# Patient Record
Sex: MALE | Race: WHITE | Employment: FULL TIME | URBAN - NONMETROPOLITAN AREA
[De-identification: names, ages, dates, MRNs, and addresses within clinical notes are randomized per-mention and may not be internally consistent; named-entity substitution may affect disease eponyms.]

---

## 2017-06-01 ENCOUNTER — OFFICE VISIT (OUTPATIENT)
Dept: PRIMARY CARE CLINIC | Age: 32
End: 2017-06-01
Payer: COMMERCIAL

## 2017-06-01 ENCOUNTER — HOSPITAL ENCOUNTER (OUTPATIENT)
Dept: GENERAL RADIOLOGY | Age: 32
Discharge: HOME | End: 2017-06-01
Payer: COMMERCIAL

## 2017-06-01 VITALS
HEIGHT: 68 IN | HEART RATE: 81 BPM | WEIGHT: 243.4 LBS | SYSTOLIC BLOOD PRESSURE: 132 MMHG | DIASTOLIC BLOOD PRESSURE: 78 MMHG | OXYGEN SATURATION: 98 % | BODY MASS INDEX: 36.89 KG/M2 | TEMPERATURE: 98 F

## 2017-06-01 DIAGNOSIS — R07.9 CHEST PAIN, UNSPECIFIED TYPE: ICD-10-CM

## 2017-06-01 DIAGNOSIS — R07.9 CHEST PAIN, UNSPECIFIED: Primary | ICD-10-CM

## 2017-06-01 DIAGNOSIS — R07.9 CHEST PAIN, UNSPECIFIED TYPE: Primary | ICD-10-CM

## 2017-06-01 PROCEDURE — 93000 ELECTROCARDIOGRAM COMPLETE: CPT | Performed by: FAMILY MEDICINE

## 2017-06-01 PROCEDURE — 99203 OFFICE O/P NEW LOW 30 MIN: CPT | Performed by: FAMILY MEDICINE

## 2017-06-01 PROCEDURE — 71020 XR CHEST STANDARD TWO VW: CPT

## 2017-06-01 RX ORDER — CETIRIZINE HYDROCHLORIDE 10 MG/1
10 TABLET ORAL PRN
COMMUNITY

## 2017-06-01 ASSESSMENT — ENCOUNTER SYMPTOMS
NAUSEA: 0
SHORTNESS OF BREATH: 1
VOMITING: 0

## 2017-06-05 ENCOUNTER — HOSPITAL ENCOUNTER (OUTPATIENT)
Dept: NON INVASIVE DIAGNOSTICS | Age: 32
Discharge: HOME | End: 2017-06-05
Payer: COMMERCIAL

## 2017-06-05 DIAGNOSIS — R07.9 CHEST PAIN, UNSPECIFIED TYPE: ICD-10-CM

## 2017-06-05 LAB
ALBUMIN SERPL-MCNC: 4.3 G/DL (ref 3.5–5.2)
ALP BLD-CCNC: 80 U/L (ref 40–130)
ALT SERPL-CCNC: 28 U/L (ref 5–41)
ANION GAP SERPL CALCULATED.3IONS-SCNC: 14 MMOL/L (ref 7–19)
AST SERPL-CCNC: 17 U/L (ref 5–40)
BILIRUB SERPL-MCNC: 0.6 MG/DL (ref 0.2–1.2)
BUN BLDV-MCNC: 16 MG/DL (ref 6–20)
CALCIUM SERPL-MCNC: 9.5 MG/DL (ref 8.6–10)
CHLORIDE BLD-SCNC: 102 MMOL/L (ref 98–111)
CHOLESTEROL, TOTAL: 200 MG/DL (ref 160–199)
CO2: 24 MMOL/L (ref 22–29)
CREAT SERPL-MCNC: 0.9 MG/DL (ref 0.5–1.2)
GFR NON-AFRICAN AMERICAN: >60
GLUCOSE BLD-MCNC: 90 MG/DL (ref 74–109)
HCT VFR BLD CALC: 40.2 % (ref 42–52)
HDLC SERPL-MCNC: 46 MG/DL (ref 55–121)
HEMOGLOBIN: 13.6 G/DL (ref 14–18)
LDL CHOLESTEROL CALCULATED: 114 MG/DL
LV EF: 50 %
LVEF MODALITY: NORMAL
MCH RBC QN AUTO: 28.4 PG (ref 27–31)
MCHC RBC AUTO-ENTMCNC: 33.8 G/DL (ref 33–37)
MCV RBC AUTO: 83.9 FL (ref 80–94)
PDW BLD-RTO: 12.4 % (ref 11.5–14.5)
PLATELET # BLD: 243 K/UL (ref 130–400)
PMV BLD AUTO: 9.9 FL (ref 9.4–12.4)
POTASSIUM SERPL-SCNC: 4.1 MMOL/L (ref 3.5–5)
RBC # BLD: 4.79 M/UL (ref 4.7–6.1)
SODIUM BLD-SCNC: 140 MMOL/L (ref 136–145)
TOTAL PROTEIN: 7.1 G/DL (ref 6.6–8.7)
TRIGL SERPL-MCNC: 202 MG/DL (ref 150–199)
WBC # BLD: 5.5 K/UL (ref 4.8–10.8)

## 2017-06-05 PROCEDURE — 93350 STRESS TTE ONLY: CPT

## 2017-06-06 ENCOUNTER — TELEPHONE (OUTPATIENT)
Dept: PRIMARY CARE CLINIC | Age: 32
End: 2017-06-06

## 2017-10-10 RX ORDER — AZITHROMYCIN 250 MG/1
TABLET, FILM COATED ORAL
Qty: 1 PACKET | Refills: 0 | Status: SHIPPED | OUTPATIENT
Start: 2017-10-10 | End: 2017-10-20

## 2018-12-20 RX ORDER — AMOXICILLIN AND CLAVULANATE POTASSIUM 875; 125 MG/1; MG/1
1 TABLET, FILM COATED ORAL 2 TIMES DAILY
Qty: 20 TABLET | Refills: 0 | Status: SHIPPED | OUTPATIENT
Start: 2018-12-20 | End: 2019-02-20 | Stop reason: SDUPTHER

## 2019-02-20 ENCOUNTER — OFFICE VISIT (OUTPATIENT)
Dept: PRIMARY CARE CLINIC | Age: 34
End: 2019-02-20
Payer: COMMERCIAL

## 2019-02-20 VITALS
HEIGHT: 68 IN | SYSTOLIC BLOOD PRESSURE: 124 MMHG | BODY MASS INDEX: 34.95 KG/M2 | WEIGHT: 230.6 LBS | DIASTOLIC BLOOD PRESSURE: 82 MMHG | TEMPERATURE: 98 F | OXYGEN SATURATION: 96 % | HEART RATE: 98 BPM

## 2019-02-20 DIAGNOSIS — J01.00 ACUTE NON-RECURRENT MAXILLARY SINUSITIS: Primary | ICD-10-CM

## 2019-02-20 PROCEDURE — 1036F TOBACCO NON-USER: CPT | Performed by: FAMILY MEDICINE

## 2019-02-20 PROCEDURE — G8427 DOCREV CUR MEDS BY ELIG CLIN: HCPCS | Performed by: FAMILY MEDICINE

## 2019-02-20 PROCEDURE — 99213 OFFICE O/P EST LOW 20 MIN: CPT | Performed by: FAMILY MEDICINE

## 2019-02-20 PROCEDURE — G8417 CALC BMI ABV UP PARAM F/U: HCPCS | Performed by: FAMILY MEDICINE

## 2019-02-20 PROCEDURE — G8484 FLU IMMUNIZE NO ADMIN: HCPCS | Performed by: FAMILY MEDICINE

## 2019-02-20 RX ORDER — AMOXICILLIN AND CLAVULANATE POTASSIUM 875; 125 MG/1; MG/1
1 TABLET, FILM COATED ORAL 2 TIMES DAILY
Qty: 20 TABLET | Refills: 0 | Status: SHIPPED | OUTPATIENT
Start: 2019-02-20 | End: 2019-03-02

## 2019-02-20 ASSESSMENT — PATIENT HEALTH QUESTIONNAIRE - PHQ9
SUM OF ALL RESPONSES TO PHQ QUESTIONS 1-9: 0
SUM OF ALL RESPONSES TO PHQ9 QUESTIONS 1 & 2: 0
SUM OF ALL RESPONSES TO PHQ QUESTIONS 1-9: 0
1. LITTLE INTEREST OR PLEASURE IN DOING THINGS: 0
2. FEELING DOWN, DEPRESSED OR HOPELESS: 0

## 2019-02-20 ASSESSMENT — ENCOUNTER SYMPTOMS
COUGH: 1
SHORTNESS OF BREATH: 0
RHINORRHEA: 1

## 2019-08-22 ENCOUNTER — OFFICE VISIT (OUTPATIENT)
Dept: PRIMARY CARE CLINIC | Age: 34
End: 2019-08-22
Payer: COMMERCIAL

## 2019-08-22 VITALS
SYSTOLIC BLOOD PRESSURE: 110 MMHG | HEIGHT: 68 IN | RESPIRATION RATE: 20 BRPM | HEART RATE: 88 BPM | WEIGHT: 267 LBS | BODY MASS INDEX: 40.47 KG/M2 | DIASTOLIC BLOOD PRESSURE: 70 MMHG | OXYGEN SATURATION: 98 %

## 2019-08-22 DIAGNOSIS — E66.01 MORBID OBESITY WITH BMI OF 40.0-44.9, ADULT (HCC): ICD-10-CM

## 2019-08-22 DIAGNOSIS — Z13.0 SCREENING FOR DEFICIENCY ANEMIA: ICD-10-CM

## 2019-08-22 DIAGNOSIS — Z00.00 ROUTINE GENERAL MEDICAL EXAMINATION AT A HEALTH CARE FACILITY: Primary | ICD-10-CM

## 2019-08-22 DIAGNOSIS — Z13.89 SCREENING FOR BLOOD OR PROTEIN IN URINE: ICD-10-CM

## 2019-08-22 DIAGNOSIS — F90.2 ATTENTION DEFICIT HYPERACTIVITY DISORDER (ADHD), COMBINED TYPE: ICD-10-CM

## 2019-08-22 DIAGNOSIS — Z13.220 SCREENING, LIPID: ICD-10-CM

## 2019-08-22 PROCEDURE — 99213 OFFICE O/P EST LOW 20 MIN: CPT | Performed by: FAMILY MEDICINE

## 2019-08-22 PROCEDURE — 99395 PREV VISIT EST AGE 18-39: CPT | Performed by: FAMILY MEDICINE

## 2019-08-22 RX ORDER — DEXTROAMPHETAMINE SACCHARATE, AMPHETAMINE ASPARTATE MONOHYDRATE, DEXTROAMPHETAMINE SULFATE AND AMPHETAMINE SULFATE 3.75; 3.75; 3.75; 3.75 MG/1; MG/1; MG/1; MG/1
15 CAPSULE, EXTENDED RELEASE ORAL DAILY
Qty: 30 CAPSULE | Refills: 0 | Status: SHIPPED | OUTPATIENT
Start: 2019-08-22 | End: 2019-11-14 | Stop reason: SDUPTHER

## 2019-08-22 ASSESSMENT — ENCOUNTER SYMPTOMS
ABDOMINAL PAIN: 0
COUGH: 0
COLOR CHANGE: 0
RHINORRHEA: 0
NAUSEA: 0
SORE THROAT: 0
EYE ITCHING: 0
SHORTNESS OF BREATH: 0

## 2019-08-22 NOTE — PROGRESS NOTES
Mary Anne Rice is a 35 y.o. male who presents today for   Chief Complaint   Patient presents with    Annual Exam       HPI  Patient is here for establishing today and due for annual exam.  No h/o cardipulmonary disease but had walking PNA at ge 13. Otherwise he has been previously healthy. He denies early cardiac death or disease in his family. Denies early colon or prostate cancer. He does have a history of weight gain. He notes he used to work out and lift weights. 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 No change in PMH, family, social, or surgical history unless mentioned above. Review of Systems   Constitutional: Negative for chills and fever. HENT: Negative for rhinorrhea and sore throat. Eyes: Negative for itching and visual disturbance. Respiratory: Negative for cough and shortness of breath. Cardiovascular: Negative for chest pain and palpitations. Gastrointestinal: Negative for abdominal pain and nausea. Endocrine: Negative for polydipsia and polyuria. Genitourinary: Negative for dysuria and frequency. Musculoskeletal: Negative for arthralgias and myalgias. Skin: Negative for color change and rash. Allergic/Immunologic: Negative for environmental allergies and food allergies. Neurological: Negative for dizziness and light-headedness. Hematological: Negative for adenopathy. Does not bruise/bleed easily. Psychiatric/Behavioral: Negative for dysphoric mood. The patient is not nervous/anxious. No past medical history on file. Current Outpatient Medications   Medication Sig Dispense Refill    amphetamine-dextroamphetamine (ADDERALL XR) 15 MG extended release capsule Take 1 capsule by mouth daily for 30 days. 30 capsule 0    cetirizine (ZYRTEC) 10 MG tablet Take 10 mg by mouth as needed for Allergies       No current facility-administered medications for this visit.         No Known Allergies    No past surgical history on

## 2019-09-10 ENCOUNTER — OFFICE VISIT (OUTPATIENT)
Dept: URGENT CARE | Age: 34
End: 2019-09-10
Payer: COMMERCIAL

## 2019-09-10 VITALS
RESPIRATION RATE: 18 BRPM | OXYGEN SATURATION: 98 % | WEIGHT: 242 LBS | HEART RATE: 87 BPM | BODY MASS INDEX: 36.68 KG/M2 | TEMPERATURE: 97.7 F | HEIGHT: 68 IN | SYSTOLIC BLOOD PRESSURE: 130 MMHG | DIASTOLIC BLOOD PRESSURE: 80 MMHG

## 2019-09-10 DIAGNOSIS — J06.9 URI WITH COUGH AND CONGESTION: Primary | ICD-10-CM

## 2019-09-10 PROCEDURE — 1036F TOBACCO NON-USER: CPT | Performed by: NURSE PRACTITIONER

## 2019-09-10 PROCEDURE — 99213 OFFICE O/P EST LOW 20 MIN: CPT | Performed by: NURSE PRACTITIONER

## 2019-09-10 PROCEDURE — G8427 DOCREV CUR MEDS BY ELIG CLIN: HCPCS | Performed by: NURSE PRACTITIONER

## 2019-09-10 PROCEDURE — G8417 CALC BMI ABV UP PARAM F/U: HCPCS | Performed by: NURSE PRACTITIONER

## 2019-09-10 RX ORDER — BENZONATATE 100 MG/1
100 CAPSULE ORAL 3 TIMES DAILY PRN
Qty: 21 CAPSULE | Refills: 0 | Status: SHIPPED | OUTPATIENT
Start: 2019-09-10 | End: 2019-09-17

## 2019-09-10 RX ORDER — METHYLPREDNISOLONE 4 MG/1
TABLET ORAL
Qty: 1 KIT | Refills: 0 | Status: SHIPPED | OUTPATIENT
Start: 2019-09-10 | End: 2019-11-14 | Stop reason: ALTCHOICE

## 2019-09-10 RX ORDER — DEXTROMETHORPHAN HYDROBROMIDE AND PROMETHAZINE HYDROCHLORIDE 15; 6.25 MG/5ML; MG/5ML
5 SYRUP ORAL NIGHTLY PRN
Qty: 120 ML | Refills: 0 | Status: SHIPPED | OUTPATIENT
Start: 2019-09-10 | End: 2020-02-20

## 2019-09-10 RX ORDER — AZITHROMYCIN 250 MG/1
TABLET, FILM COATED ORAL
Qty: 1 PACKET | Refills: 0 | Status: SHIPPED | OUTPATIENT
Start: 2019-09-10 | End: 2019-09-20

## 2019-09-10 ASSESSMENT — ENCOUNTER SYMPTOMS
SORE THROAT: 0
COUGH: 1
RHINORRHEA: 1

## 2019-09-10 NOTE — PROGRESS NOTES
6.25-15 MG/5ML syrup    benzonatate (TESSALON) 100 MG capsule       :Plan   1. Rest and increase fluid intake. 2. Take tessalon as needed for coughing. Take Promethazine DM as needed at night for cough-this medication can make you drowsy. 3. Monitor for fever and treat as needed with tylenol or ibuprofen  4. If patient is not improving or developing any new/worsening symptoms then return to clinic as needed or follow up with PCP  5. Antibiotic and steroid pack as prescribed      No orders of the defined types were placed in this encounter. Return if symptoms worsen or fail to improve. Orders Placed This Encounter   Medications    azithromycin (ZITHROMAX Z-EUSEBIO) 250 MG tablet     Sig: Take as directed     Dispense:  1 packet     Refill:  0    methylPREDNISolone (MEDROL DOSEPACK) 4 MG tablet     Sig: Take by mouth. Dispense:  1 kit     Refill:  0    promethazine-dextromethorphan (PROMETHAZINE-DM) 6.25-15 MG/5ML syrup     Sig: Take 5 mLs by mouth nightly as needed for Cough     Dispense:  120 mL     Refill:  0    benzonatate (TESSALON) 100 MG capsule     Sig: Take 1 capsule by mouth 3 times daily as needed for Cough     Dispense:  21 capsule     Refill:  0       Patient given educational materials- see patient instructions. Discussed use, benefit, and side effects of prescribedmedications. All patient questions answered. Pt voiced understanding. Patient Instructions       Patient Education        Upper Respiratory Infection (Cold): Care Instructions  Your Care Instructions    An upper respiratory infection, or URI, is an infection of the nose, sinuses, or throat. URIs are spread by coughs, sneezes, and direct contact. The common cold is the most frequent kind of URI. The flu and sinus infections are other kinds of URIs. Almost all URIs are caused by viruses. Antibiotics won't cure them. But you can treat most infections with home care.  This may include drinking lots of fluids and taking

## 2019-09-30 ENCOUNTER — OFFICE VISIT (OUTPATIENT)
Dept: PRIMARY CARE CLINIC | Age: 34
End: 2019-09-30
Payer: COMMERCIAL

## 2019-09-30 DIAGNOSIS — F90.2 ATTENTION DEFICIT HYPERACTIVITY DISORDER (ADHD), COMBINED TYPE: Primary | ICD-10-CM

## 2019-09-30 DIAGNOSIS — R09.82 POSTNASAL DRIP: ICD-10-CM

## 2019-09-30 DIAGNOSIS — R05.3 CHRONIC COUGH: ICD-10-CM

## 2019-09-30 PROCEDURE — 99213 OFFICE O/P EST LOW 20 MIN: CPT | Performed by: FAMILY MEDICINE

## 2019-09-30 PROCEDURE — G8417 CALC BMI ABV UP PARAM F/U: HCPCS | Performed by: FAMILY MEDICINE

## 2019-09-30 PROCEDURE — 1036F TOBACCO NON-USER: CPT | Performed by: FAMILY MEDICINE

## 2019-09-30 PROCEDURE — G8428 CUR MEDS NOT DOCUMENT: HCPCS | Performed by: FAMILY MEDICINE

## 2019-09-30 RX ORDER — DEXTROAMPHETAMINE SACCHARATE, AMPHETAMINE ASPARTATE MONOHYDRATE, DEXTROAMPHETAMINE SULFATE AND AMPHETAMINE SULFATE 7.5; 7.5; 7.5; 7.5 MG/1; MG/1; MG/1; MG/1
30 CAPSULE, EXTENDED RELEASE ORAL DAILY
Qty: 30 CAPSULE | Refills: 0 | Status: SHIPPED | OUTPATIENT
Start: 2019-09-30 | End: 2019-11-14 | Stop reason: ALTCHOICE

## 2019-09-30 RX ORDER — FLUTICASONE PROPIONATE 50 MCG
1 SPRAY, SUSPENSION (ML) NASAL DAILY
Qty: 2 BOTTLE | Refills: 1 | Status: SHIPPED | OUTPATIENT
Start: 2019-09-30 | End: 2020-08-25 | Stop reason: ALTCHOICE

## 2019-09-30 ASSESSMENT — ENCOUNTER SYMPTOMS
ABDOMINAL PAIN: 0
COUGH: 1
NAUSEA: 0
CHEST TIGHTNESS: 0
VOMITING: 0
SHORTNESS OF BREATH: 0
WHEEZING: 0
CONSTIPATION: 0
DIARRHEA: 0

## 2019-11-14 ENCOUNTER — OFFICE VISIT (OUTPATIENT)
Dept: PRIMARY CARE CLINIC | Age: 34
End: 2019-11-14
Payer: COMMERCIAL

## 2019-11-14 VITALS
TEMPERATURE: 97 F | RESPIRATION RATE: 12 BRPM | DIASTOLIC BLOOD PRESSURE: 76 MMHG | BODY MASS INDEX: 36.43 KG/M2 | WEIGHT: 240.4 LBS | OXYGEN SATURATION: 98 % | SYSTOLIC BLOOD PRESSURE: 128 MMHG | HEIGHT: 68 IN | HEART RATE: 104 BPM

## 2019-11-14 DIAGNOSIS — F90.2 ATTENTION DEFICIT HYPERACTIVITY DISORDER (ADHD), COMBINED TYPE: Primary | ICD-10-CM

## 2019-11-14 PROCEDURE — G8417 CALC BMI ABV UP PARAM F/U: HCPCS | Performed by: FAMILY MEDICINE

## 2019-11-14 PROCEDURE — 1036F TOBACCO NON-USER: CPT | Performed by: FAMILY MEDICINE

## 2019-11-14 PROCEDURE — G8484 FLU IMMUNIZE NO ADMIN: HCPCS | Performed by: FAMILY MEDICINE

## 2019-11-14 PROCEDURE — G8427 DOCREV CUR MEDS BY ELIG CLIN: HCPCS | Performed by: FAMILY MEDICINE

## 2019-11-14 PROCEDURE — 99213 OFFICE O/P EST LOW 20 MIN: CPT | Performed by: FAMILY MEDICINE

## 2019-11-14 RX ORDER — DEXTROAMPHETAMINE SACCHARATE, AMPHETAMINE ASPARTATE MONOHYDRATE, DEXTROAMPHETAMINE SULFATE AND AMPHETAMINE SULFATE 3.75; 3.75; 3.75; 3.75 MG/1; MG/1; MG/1; MG/1
15 CAPSULE, EXTENDED RELEASE ORAL DAILY
Qty: 90 CAPSULE | Refills: 0 | Status: SHIPPED | OUTPATIENT
Start: 2019-11-14 | End: 2020-01-30 | Stop reason: SDUPTHER

## 2019-11-14 ASSESSMENT — ENCOUNTER SYMPTOMS
WHEEZING: 0
COUGH: 0
VOMITING: 0
DIARRHEA: 0
NAUSEA: 0
CONSTIPATION: 0
SHORTNESS OF BREATH: 0
CHEST TIGHTNESS: 0
ABDOMINAL PAIN: 0

## 2020-02-20 ENCOUNTER — OFFICE VISIT (OUTPATIENT)
Dept: PRIMARY CARE CLINIC | Age: 35
End: 2020-02-20
Payer: COMMERCIAL

## 2020-02-20 VITALS
WEIGHT: 238 LBS | TEMPERATURE: 97.1 F | BODY MASS INDEX: 36.19 KG/M2 | HEART RATE: 84 BPM | OXYGEN SATURATION: 98 % | SYSTOLIC BLOOD PRESSURE: 114 MMHG | DIASTOLIC BLOOD PRESSURE: 72 MMHG

## 2020-02-20 PROCEDURE — 1036F TOBACCO NON-USER: CPT | Performed by: FAMILY MEDICINE

## 2020-02-20 PROCEDURE — G8427 DOCREV CUR MEDS BY ELIG CLIN: HCPCS | Performed by: FAMILY MEDICINE

## 2020-02-20 PROCEDURE — G8417 CALC BMI ABV UP PARAM F/U: HCPCS | Performed by: FAMILY MEDICINE

## 2020-02-20 PROCEDURE — 99213 OFFICE O/P EST LOW 20 MIN: CPT | Performed by: FAMILY MEDICINE

## 2020-02-20 PROCEDURE — 90715 TDAP VACCINE 7 YRS/> IM: CPT | Performed by: FAMILY MEDICINE

## 2020-02-20 PROCEDURE — G8484 FLU IMMUNIZE NO ADMIN: HCPCS | Performed by: FAMILY MEDICINE

## 2020-02-20 PROCEDURE — 90471 IMMUNIZATION ADMIN: CPT | Performed by: FAMILY MEDICINE

## 2020-02-20 RX ORDER — DEXTROAMPHETAMINE SACCHARATE, AMPHETAMINE ASPARTATE MONOHYDRATE, DEXTROAMPHETAMINE SULFATE AND AMPHETAMINE SULFATE 3.75; 3.75; 3.75; 3.75 MG/1; MG/1; MG/1; MG/1
15 CAPSULE, EXTENDED RELEASE ORAL EVERY MORNING
Qty: 90 CAPSULE | Refills: 0 | Status: SHIPPED | OUTPATIENT
Start: 2020-02-20 | End: 2020-04-22 | Stop reason: SDUPTHER

## 2020-02-20 ASSESSMENT — ENCOUNTER SYMPTOMS
ABDOMINAL PAIN: 0
WHEEZING: 0
COUGH: 0
VOMITING: 0
CHEST TIGHTNESS: 0
NAUSEA: 0
SHORTNESS OF BREATH: 0
DIARRHEA: 0
CONSTIPATION: 0

## 2020-02-20 NOTE — PROGRESS NOTES
Orders Placed This Encounter   Medications    amphetamine-dextroamphetamine (ADDERALL XR) 15 MG extended release capsule     Sig: Take 1 capsule by mouth every morning for 90 days. Dispense:  90 capsule     Refill:  0     generic     Medications Discontinued During This Encounter   Medication Reason    promethazine-dextromethorphan (PROMETHAZINE-DM) 6.25-15 MG/5ML syrup     amphetamine-dextroamphetamine (ADDERALL XR) 15 MG extended release capsule Therapy completed     Patient Instructions   Please arrive 15 minutes early to next follow up appointment in 3 months or schedule an appointment sooner if needed. Patient given educational handouts and has had all questions answered. Patient voices understanding and agrees to plans along with risks and benefits of plan. Patient isinstructed to continue prior meds, diet, and exercise plans unless instructed otherwise. Patient agrees to follow up as instructed and sooner if needed. Patient agrees to go to ER if condition becomes emergent. Notesmay be completed with dictation device and spelling errors may occur. Laila Walker am scribing for and in the presence of Dr. Sarah Bernal. 2/20/2020   I, Dr. Lizzeth English, the medical provider for the encounter with patient on 2/27/2020 at 6:33 AM have reviewed my scribe's documentation in earnest and take sole ownership of the intellectual property represented in documentation by my medical scribe and myself within this document. Some errors may occur in proofreading. Return in about 3 months (around 5/20/2020) for f/u adderall.

## 2020-03-08 ENCOUNTER — HOSPITAL ENCOUNTER (OUTPATIENT)
Dept: GENERAL RADIOLOGY | Age: 35
Discharge: HOME | End: 2020-03-08
Payer: COMMERCIAL

## 2020-03-08 ENCOUNTER — OFFICE VISIT (OUTPATIENT)
Dept: URGENT CARE | Age: 35
End: 2020-03-08
Payer: COMMERCIAL

## 2020-03-08 VITALS
BODY MASS INDEX: 36.37 KG/M2 | OXYGEN SATURATION: 99 % | HEART RATE: 87 BPM | RESPIRATION RATE: 18 BRPM | SYSTOLIC BLOOD PRESSURE: 112 MMHG | DIASTOLIC BLOOD PRESSURE: 81 MMHG | TEMPERATURE: 98.6 F | HEIGHT: 68 IN | WEIGHT: 240 LBS

## 2020-03-08 PROCEDURE — 73110 X-RAY EXAM OF WRIST: CPT

## 2020-03-08 PROCEDURE — 99213 OFFICE O/P EST LOW 20 MIN: CPT | Performed by: NURSE PRACTITIONER

## 2020-03-08 ASSESSMENT — VISUAL ACUITY: OU: 1

## 2020-03-08 NOTE — PATIENT INSTRUCTIONS
band.  3. Slowly bend your wrist outward and toward your knee for a count of 2. Then slowly move your wrist back to the starting position to a count of 5.  4. Repeat 8 to 12 times. Resisted forearm pronation   1. Sit leaning forward with your legs slightly spread. Then place your forearm on your thigh with your affected hand and wrist in front of your knee. 2. Grasp one end of an exercise band with your palm up. Step on the other end. 3. Keeping your wrist straight, roll your palm inward toward your thigh for a count of 2. Then slowly move your wrist back to the starting position to a count of 5.  4. Repeat 8 to 12 times. Resisted supination   1. Sit leaning forward with your legs slightly spread. Then place your forearm on your thigh with your affected hand and wrist in front of your knee. 2. Grasp one end of an exercise band with your palm down. Step on the other end. 3. Keeping your wrist straight, roll your palm outward and away from your thigh for a count of 2. Then slowly move your wrist back to the starting position to a count of 5.  4. Repeat 8 to 12 times. Follow-up care is a key part of your treatment and safety. Be sure to make and go to all appointments, and call your doctor if you are having problems. It's also a good idea to know your test results and keep a list of the medicines you take. Where can you learn more? Go to [email or web address] and sign in to your [employer] account. Enter S110 in the [facility] box to learn more about \"Wrist Sprain: Rehab Exercises. \"     If you do not have an account, please click on the \"Sign Up Now\" link. Current as of: June 26, 2019  Content Version: 12.3  © 4570-7331 [employer]wise, [employer]. Care instructions adapted under license by [facility] ([facility]).  If you have questions about a medical condition or this instruction, always ask your healthcare professional. [employer] any warranty or liability for your use of this information.

## 2020-03-08 NOTE — PROGRESS NOTES
MG tablet Take 10 mg by mouth as needed for Allergies       No current facility-administered medications for this visit. No Known Allergies    Health Maintenance   Topic Date Due    Varicella vaccine (1 of 2 - 2-dose childhood series) 12/01/1986    HIV screen  12/01/2000    Flu vaccine (1) 09/01/2019    DTaP/Tdap/Td vaccine (2 - Td) 02/20/2030    Shingles Vaccine (1 of 2) 12/01/2035    Hepatitis A vaccine  Aged Out    Hepatitis B vaccine  Aged Out    Hib vaccine  Aged Out    Meningococcal (ACWY) vaccine  Aged Out    Pneumococcal 0-64 years Vaccine  Aged Out       Subjective:     Review of Systems   Constitutional: Negative. Musculoskeletal: Positive for arthralgias. Negative for myalgias. Right wrist pain   Skin: Negative. Neurological: Negative for tingling. Hematological: Negative.        :Objective      Physical Exam  Vitals signs and nursing note reviewed. Constitutional:       General: He is awake. He is not in acute distress. Appearance: Normal appearance. He is well-developed, well-groomed and overweight. He is not ill-appearing. HENT:      Head: Normocephalic. Right Ear: Hearing normal.      Left Ear: Hearing normal.      Nose: Nose normal.      Mouth/Throat:      Lips: Pink. Eyes:      General: Vision grossly intact. Cardiovascular:      Rate and Rhythm: Normal rate. Pulmonary:      Effort: Pulmonary effort is normal. No respiratory distress. Musculoskeletal:         General: No deformity. Right wrist: He exhibits tenderness. He exhibits normal range of motion, no bony tenderness, no swelling, no effusion, no deformity and no laceration. Comments: Pain with extension of right wrist, no pain with flexion of right wrist, no edema, no ecchymosis noted   Skin:     General: Skin is warm and dry. Capillary Refill: Capillary refill takes less than 2 seconds. Neurological:      General: No focal deficit present.       Mental Status: He is alert, oriented to person, place, and time and easily aroused. Mental status is at baseline. Psychiatric:         Attention and Perception: Attention normal.         Mood and Affect: Mood normal.         Speech: Speech normal.         Behavior: Behavior normal. Behavior is cooperative. /81   Pulse 87   Temp 98.6 °F (37 °C) (Temporal)   Resp 18   Ht 5' 8\" (1.727 m)   Wt 240 lb (108.9 kg)   SpO2 99%   BMI 36.49 kg/m²     :Assessment       Diagnosis Orders   1. Acute pain of right wrist  XR WRIST RIGHT (MIN 3 VIEWS)   2. Right wrist sprain, initial encounter         :Plan      Orders Placed This Encounter   Procedures    XR WRIST RIGHT (MIN 3 VIEWS)     Standing Status:   Future     Number of Occurrences:   1     Standing Expiration Date:   3/8/2021     Order Specific Question:   Reason for exam:     Answer:   fall with pain   X-ray right wrist:  FINDINGS:   Frontal, lateral and oblique radiographs of the right wrist were  provided for review. There is no evidence of acute fracture or dislocation. The soft  tissues are normal in appearance. The joint spaces are maintained. IMPRESSION:  1. Unremarkable radiographs of the right wrist.    No follow-ups on file. No orders of the defined types were placed in this encounter. Patient Instructions     OTC Tylenol or Motrin as needed for pain  Ice to right wrist 15-20 min 3-4 times daily  Follow up with PCP or [facility] for worsening or unresolved symptoms. Patient Education        Wrist Sprain: Care Instructions  Your Care Instructions    Your wrist hurts because you have stretched or torn ligaments, which connect the bones in your wrist.  Wrist sprains usually take from 2 to 10 weeks to heal, but some take longer. Usually, the more pain you have, the more severe your wrist sprain is and the longer it will take to heal. You can heal faster and regain strength in your wrist with good home treatment.   Follow-up care is a key part of your treatment and safety. Be sure to make and go to all appointments, and call your doctor if you are having problems. It's also a good idea to know your test results and keep a list of the medicines you take. How can you care for yourself at home? · Prop up your arm on a pillow when you ice it or anytime you sit or lie down for the next 3 days. Try to keep your wrist above the level of your heart. This will help reduce swelling. · Put ice or cold packs on your wrist for 10 to 20 minutes at a time. Try to do this every 1 to 2 hours for the next 3 days (when you are awake) or until the swelling goes down. Put a thin cloth between the ice pack and your skin. · After 2 or 3 days, if your swelling is gone, apply a heating pad set on low or a warm cloth to your wrist. This helps keep your wrist flexible. Some doctors suggest that you go back and forth between hot and cold. · If you have an elastic bandage, keep it on for the next 24 to 36 hours. The bandage should be snug but not so tight that it causes numbness or tingling. To rewrap the wrist, wrap the bandage around the hand a few times, beginning at the fingers. Then wrap it around the hand between the thumb and index finger, ending by circling the wrist several times. · If your doctor gave you a splint or brace, wear it as directed to protect your wrist until it has healed. · Take pain medicines exactly as directed. ? If the doctor gave you a prescription medicine for pain, take it as prescribed. ? If you are not taking a prescription pain medicine, ask your doctor if you can take an over-the-counter medicine. · Try not to use your injured wrist and hand. When should you call for help?   Call your doctor now or seek immediate medical care if:    · Your hand or fingers are cool or pale or change color.    Watch closely for changes in your health, and be sure to contact your doctor if:    · Your pain gets worse.     · Your wrist has not improved after 1 an exercise band with your palm down. Step on the other end.  3. Slowly bend your wrist upward for a count of 2. Then lower your wrist slowly to a count of 5.  4. Repeat 8 to 12 times. Resisted wrist flexion   1. Sit leaning forward with your legs slightly spread. Then place your forearm on your thigh with your affected hand and wrist in front of your knee. 2. Grasp one end of an exercise band with your palm up. Step on the other end.  3. Slowly bend your wrist upward for a count of 2. Then lower your wrist slowly to a count of 5.  4. Repeat 8 to 12 times. Resisted radial deviation   1. Sit leaning forward with your legs slightly spread. Then place your forearm on your thigh with your affected hand and wrist in front of your knee. 2. Grasp one end of an exercise band with your hand facing toward your other thigh. Step on the other end.  3. Slowly bend your wrist upward for a count of 2. Then lower your wrist slowly to a count of 5.  4. Repeat 8 to 12 times. Resisted ulnar deviation   1. Sit leaning forward with your legs slightly spread. Then place your forearm on your thigh with your affected hand and wrist by the inside of your knee. 2. Grasp one end of an exercise band with your palm down. Step on the other end with the foot opposite the hand holding the band. 3. Slowly bend your wrist outward and toward your knee for a count of 2. Then slowly move your wrist back to the starting position to a count of 5.  4. Repeat 8 to 12 times. Resisted forearm pronation   1. Sit leaning forward with your legs slightly spread. Then place your forearm on your thigh with your affected hand and wrist in front of your knee. 2. Grasp one end of an exercise band with your palm up. Step on the other end. 3. Keeping your wrist straight, roll your palm inward toward your thigh for a count of 2. Then slowly move your wrist back to the starting position to a count of 5.  4. Repeat 8 to 12 times.     Resisted supination

## 2020-04-24 RX ORDER — DEXTROAMPHETAMINE SACCHARATE, AMPHETAMINE ASPARTATE MONOHYDRATE, DEXTROAMPHETAMINE SULFATE AND AMPHETAMINE SULFATE 3.75; 3.75; 3.75; 3.75 MG/1; MG/1; MG/1; MG/1
15 CAPSULE, EXTENDED RELEASE ORAL EVERY MORNING
Qty: 90 CAPSULE | Refills: 0 | Status: SHIPPED | OUTPATIENT
Start: 2020-04-24 | End: 2020-05-21 | Stop reason: SDUPTHER

## 2020-05-21 ENCOUNTER — TELEMEDICINE (OUTPATIENT)
Dept: PRIMARY CARE CLINIC | Age: 35
End: 2020-05-21
Payer: COMMERCIAL

## 2020-05-21 PROCEDURE — G8417 CALC BMI ABV UP PARAM F/U: HCPCS | Performed by: FAMILY MEDICINE

## 2020-05-21 PROCEDURE — 1036F TOBACCO NON-USER: CPT | Performed by: FAMILY MEDICINE

## 2020-05-21 PROCEDURE — G8428 CUR MEDS NOT DOCUMENT: HCPCS | Performed by: FAMILY MEDICINE

## 2020-05-21 PROCEDURE — 99213 OFFICE O/P EST LOW 20 MIN: CPT | Performed by: FAMILY MEDICINE

## 2020-05-21 RX ORDER — DEXTROAMPHETAMINE SACCHARATE, AMPHETAMINE ASPARTATE MONOHYDRATE, DEXTROAMPHETAMINE SULFATE AND AMPHETAMINE SULFATE 3.75; 3.75; 3.75; 3.75 MG/1; MG/1; MG/1; MG/1
15 CAPSULE, EXTENDED RELEASE ORAL EVERY MORNING
Qty: 90 CAPSULE | Refills: 0 | Status: SHIPPED | OUTPATIENT
Start: 2020-05-21 | End: 2020-08-19 | Stop reason: SDUPTHER

## 2020-05-21 ASSESSMENT — ENCOUNTER SYMPTOMS
CHEST TIGHTNESS: 0
NAUSEA: 0
SHORTNESS OF BREATH: 0
VOMITING: 0
CONSTIPATION: 0
WHEEZING: 0
DIARRHEA: 0
ABDOMINAL PAIN: 0
COUGH: 0

## 2020-05-21 NOTE — PROGRESS NOTES
Abnormal-     Other pertinent observable physical exam findings-     ASSESSMENT/PLAN:    ICD-10-CM    1. Attention deficit hyperactivity disorder (ADHD), combined type F90.2 amphetamine-dextroamphetamine (ADDERALL XR) 15 MG extended release capsule       ADHD controlled. Plan will be to do annual once yearly and 3 month video visits for ADHD. Orders Placed This Encounter   Medications    amphetamine-dextroamphetamine (ADDERALL XR) 15 MG extended release capsule     Sig: Take 1 capsule by mouth every morning for 90 days. Dispense:  90 capsule     Refill:  0     generic       No orders of the defined types were placed in this encounter. Return in about 3 months (around 8/21/2020) for annual - ADHD - OV. Carol Ann Villalobos is a 29 y.o. male being evaluated by a Virtual Visit (video visit) encounter to address concerns as mentioned above. A caregiver was present when appropriate. Due to this being a TeleHealth encounter ([facility]), evaluation of the following organ systems was limited: Vitals/Constitutional/EENT/Resp/CV/GI//MS/Neuro/Skin/Heme-Lymph-Imm. Pursuant to the emergency declaration under the [address] [place] [address], [address] [place] [address] authority and the [employer] and [employer] Act, this Virtual Visit was conducted with patient's (and/or legal guardian's) consent, to reduce the patient's risk of exposure to COVID-19 and provide necessary medical care. The patient (and/or legal guardian) has also been advised to contact this office for worsening conditions or problems, and seek emergency medical treatment and/or call 911 if deemed necessary. Services were provided through a video synchronous discussion virtually to substitute for in-person clinic visit. Patient and provider were located at their individual homes or provider at secure site for business.     --Nesha Sheets on 5/21/2020 at 9:48 AM  Dr. DAMIAN

## 2020-08-19 ENCOUNTER — TELEMEDICINE (OUTPATIENT)
Dept: PRIMARY CARE CLINIC | Age: 35
End: 2020-08-19
Payer: COMMERCIAL

## 2020-08-19 PROBLEM — F90.2 ATTENTION DEFICIT HYPERACTIVITY DISORDER (ADHD), COMBINED TYPE: Chronic | Status: ACTIVE | Noted: 2019-08-22

## 2020-08-19 PROCEDURE — 99213 OFFICE O/P EST LOW 20 MIN: CPT | Performed by: FAMILY MEDICINE

## 2020-08-19 RX ORDER — DEXTROAMPHETAMINE SACCHARATE, AMPHETAMINE ASPARTATE MONOHYDRATE, DEXTROAMPHETAMINE SULFATE AND AMPHETAMINE SULFATE 3.75; 3.75; 3.75; 3.75 MG/1; MG/1; MG/1; MG/1
15 CAPSULE, EXTENDED RELEASE ORAL EVERY MORNING
Qty: 90 CAPSULE | Refills: 0 | Status: SHIPPED | OUTPATIENT
Start: 2020-08-19 | End: 2020-11-25 | Stop reason: SDUPTHER

## 2020-08-19 ASSESSMENT — ENCOUNTER SYMPTOMS
CONSTIPATION: 0
ABDOMINAL PAIN: 0
SHORTNESS OF BREATH: 0
NAUSEA: 0
WHEEZING: 0
VOMITING: 0
COUGH: 0
DIARRHEA: 0
CHEST TIGHTNESS: 0

## 2020-08-19 NOTE — PROGRESS NOTES
2020    TELEHEALTH EVALUATION -- Audio/Visual (During BXCMR-29 public health emergency)    HPI:    Elham Abraham (:  1985) has requested an audio/video evaluation for the following concern(s):    Patient presents today via video visit for f/u ADHD and meds. He is working from home some. Has been doing well on adderall XR since we changed it. Notes it is covered better. Has been copoing w/ covid. Review of Systems   Constitutional: Negative for chills and fever. Respiratory: Negative for cough, chest tightness, shortness of breath and wheezing. Cardiovascular: Negative for chest pain, palpitations and leg swelling. Gastrointestinal: Negative for abdominal pain, constipation, diarrhea, nausea and vomiting. Genitourinary: Negative for difficulty urinating, dysuria and frequency. Psychiatric/Behavioral: Negative for confusion, decreased concentration and suicidal ideas. The patient is not hyperactive. Prior to Visit Medications    Medication Sig Taking? Authorizing Provider   amphetamine-dextroamphetamine (ADDERALL XR) 15 MG extended release capsule Take 1 capsule by mouth every morning for 90 days.  Yes Clau Mukherjee MD   fluticasone [facility]) 50 MCG/ACT nasal spray 1 spray by Each Nostril route daily  Patient not taking: Reported on 3/8/2020  Clau Mukherjee MD   cetirizine (ZYRTEC) 10 MG tablet Take 10 mg by mouth as needed for Allergies  Historical Provider, MD       Social History     Tobacco Use    Smoking status: Never Smoker    Smokeless tobacco: Never Used   Substance Use Topics    Alcohol use: Yes     Comment: daily     Drug use: No        No Known Allergies,   Past Medical History:   Diagnosis Date    ADHD (attention deficit hyperactivity disorder)    , No past surgical history on file.,   Social History     Tobacco Use    Smoking status: Never Smoker    Smokeless tobacco: Never Used   Substance Use Topics    Alcohol use: Yes     Comment: daily     Drug use: No   ,   Family History   Problem Relation Age of Onset    No Known Problems Mother     No Known Problems Father    ,   Immunization History   Administered Date(s) Administered    Tdap (Boostrix, Adacel) 02/20/2020   ,   Health Maintenance   Topic Date Due    Varicella vaccine (1 of 2 - 2-dose childhood series) 12/01/1986    HIV screen  12/01/2000    Flu vaccine (1) 09/01/2020    DTaP/Tdap/Td vaccine (2 - Td) 02/20/2030    Hepatitis A vaccine  Aged Out    Hepatitis B vaccine  Aged Out    Hib vaccine  Aged Out    Meningococcal (ACWY) vaccine  Aged Out    Pneumococcal 0-64 years Vaccine  Aged Out       PHYSICAL EXAMINATION:  [ INSTRUCTIONS:  \"[x]\" Indicates a positive item  \"[]\" Indicates a negative item  -- DELETE ALL ITEMS NOT EXAMINED]  Vital Signs: (As obtained by patient/caregiver or practitioner observation)    Blood pressure-  Heart rate-    Respiratory rate-    Temperature-  Pulse oximetry-     Constitutional: [x] Appears well-developed and well-nourished [] No apparent distress      [] Abnormal-   Mental status  [x] Alert and awake  [x] Oriented to person/place/time [x]Able to follow commands      Eyes:  EOM    [x]  Normal  [] Abnormal-  Sclera  [x]  Normal  [] Abnormal -         Discharge []  None visible  [] Abnormal -    HENT:   [x] Normocephalic, atraumatic.   [] Abnormal   [x] Mouth/Throat: Mucous membranes are moist.     External Ears [x] Normal  [] Abnormal-     Neck: [x] No visualized mass     Pulmonary/Chest: [x] Respiratory effort normal.  [x] No visualized signs of difficulty breathing or respiratory distress        [] Abnormal-      Musculoskeletal:   [x] Normal gait with no signs of ataxia         [x] Normal range of motion of neck        [] Abnormal-       Neurological:        [x] No Facial Asymmetry (Cranial nerve 7 motor function) (limited exam to video visit)          [x] No gaze palsy        [] Abnormal-         Skin:        [x] No significant exanthematous lesions or discoloration noted on facial skin         [] Abnormal-            Psychiatric:       [x] Normal Affect [x] No Hallucinations        [] Abnormal-     Other pertinent observable physical exam findings-     ASSESSMENT/PLAN:    ICD-10-CM    1. Attention deficit hyperactivity disorder (ADHD), combined type  F90.2 amphetamine-dextroamphetamine (ADDERALL XR) 15 MG extended release capsule   2. Counseling, unspecified  Z71.9        Continue ADHD meds. Patient received approximately 5 minutes of counseling on COVID-19 pandemic in regards to hygiene, symptoms, following public guidelines, and precautions to take. Orders Placed This Encounter   Medications    amphetamine-dextroamphetamine (ADDERALL XR) 15 MG extended release capsule     Sig: Take 1 capsule by mouth every morning for 90 days. Dispense:  90 capsule     Refill:  0     generic       No orders of the defined types were placed in this encounter. Return in about 3 months (around 11/19/2020) for annual.    Jeannine Hernadez is a 29 y.o. male being evaluated by a Virtual Visit (video visit) encounter to address concerns as mentioned above. A caregiver was present when appropriate. Due to this being a TeleHealth encounter (During XZWNW-60 public health emergency), evaluation of the following organ systems was limited: Vitals/Constitutional/EENT/Resp/CV/GI//MS/Neuro/Skin/Heme-Lymph-Imm. Pursuant to the emergency declaration under the [facility], [address] [place] [address] authority and the [employer] and [employer] Act, this Virtual Visit was conducted with patient's (and/or legal guardian's) consent, to reduce the patient's risk of exposure to COVID-19 and provide necessary medical care. The patient (and/or legal guardian) has also been advised to contact this office for worsening conditions or problems, and seek emergency medical treatment and/or call 911 if deemed necessary.      Services were provided through a video synchronous discussion virtually to substitute for in-person clinic visit. Patient and provider were located at their individual homes or provider at secure site for business. --Jacob Iglesias MD on 8/19/2020 at 8:14 AM    An electronic signature was used to authenticate this note.

## 2020-08-25 ENCOUNTER — APPOINTMENT (OUTPATIENT)
Dept: CT IMAGING | Age: 35
End: 2020-08-25
Payer: COMMERCIAL

## 2020-08-25 ENCOUNTER — APPOINTMENT (OUTPATIENT)
Dept: GENERAL RADIOLOGY | Age: 35
End: 2020-08-25
Payer: COMMERCIAL

## 2020-08-25 ENCOUNTER — HOSPITAL ENCOUNTER (EMERGENCY)
Age: 35
Discharge: HOME | End: 2020-08-26
Attending: EMERGENCY MEDICINE
Payer: COMMERCIAL

## 2020-08-25 VITALS
HEART RATE: 87 BPM | BODY MASS INDEX: 35.61 KG/M2 | SYSTOLIC BLOOD PRESSURE: 125 MMHG | OXYGEN SATURATION: 99 % | TEMPERATURE: 97.7 F | HEIGHT: 68 IN | WEIGHT: 235 LBS | DIASTOLIC BLOOD PRESSURE: 86 MMHG | RESPIRATION RATE: 19 BRPM

## 2020-08-25 LAB
ALBUMIN SERPL-MCNC: 4.4 G/DL (ref 3.5–5.2)
ALP BLD-CCNC: 73 U/L (ref 40–130)
ALT SERPL-CCNC: 25 U/L (ref 5–41)
ANION GAP SERPL CALCULATED.3IONS-SCNC: 11 MMOL/L (ref 7–19)
AST SERPL-CCNC: 16 U/L (ref 5–40)
BASOPHILS ABSOLUTE: 0 K/UL (ref 0–0.2)
BASOPHILS RELATIVE PERCENT: 0.3 % (ref 0–1)
BILIRUB SERPL-MCNC: <0.2 MG/DL (ref 0.2–1.2)
BUN BLDV-MCNC: 17 MG/DL (ref 6–20)
CALCIUM SERPL-MCNC: 9.6 MG/DL (ref 8.6–10)
CHLORIDE BLD-SCNC: 100 MMOL/L (ref 98–111)
CO2: 25 MMOL/L (ref 22–29)
CREAT SERPL-MCNC: 0.9 MG/DL (ref 0.5–1.2)
D DIMER: <0.27 UG/ML FEU (ref 0–0.48)
EOSINOPHILS ABSOLUTE: 0 K/UL (ref 0–0.6)
EOSINOPHILS RELATIVE PERCENT: 0.1 % (ref 0–5)
GFR AFRICAN AMERICAN: >59
GFR NON-AFRICAN AMERICAN: >60
GLUCOSE BLD-MCNC: 114 MG/DL (ref 74–109)
HCT VFR BLD CALC: 40.1 % (ref 42–52)
HEMOGLOBIN: 13.7 G/DL (ref 14–18)
IMMATURE GRANULOCYTES #: 0.1 K/UL
LYMPHOCYTES ABSOLUTE: 1.9 K/UL (ref 1.1–4.5)
LYMPHOCYTES RELATIVE PERCENT: 20.1 % (ref 20–40)
MCH RBC QN AUTO: 27.9 PG (ref 27–31)
MCHC RBC AUTO-ENTMCNC: 34.2 G/DL (ref 33–37)
MCV RBC AUTO: 81.7 FL (ref 80–94)
MONOCYTES ABSOLUTE: 0.3 K/UL (ref 0–0.9)
MONOCYTES RELATIVE PERCENT: 3.5 % (ref 0–10)
NEUTROPHILS ABSOLUTE: 7 K/UL (ref 1.5–7.5)
NEUTROPHILS RELATIVE PERCENT: 75.5 % (ref 50–65)
PDW BLD-RTO: 12.5 % (ref 11.5–14.5)
PLATELET # BLD: 298 K/UL (ref 130–400)
PMV BLD AUTO: 9.8 FL (ref 9.4–12.4)
POTASSIUM REFLEX MAGNESIUM: 4.1 MMOL/L (ref 3.5–5)
RBC # BLD: 4.91 M/UL (ref 4.7–6.1)
SODIUM BLD-SCNC: 136 MMOL/L (ref 136–145)
TOTAL CK: 166 U/L (ref 39–308)
TOTAL PROTEIN: 7 G/DL (ref 6.6–8.7)
TROPONIN: <0.01 NG/ML (ref 0–0.03)
WBC # BLD: 9.3 K/UL (ref 4.8–10.8)

## 2020-08-25 PROCEDURE — 36415 COLL VENOUS BLD VENIPUNCTURE: CPT

## 2020-08-25 PROCEDURE — 99285 EMERGENCY DEPT VISIT HI MDM: CPT

## 2020-08-25 PROCEDURE — 85025 COMPLETE CBC W/AUTO DIFF WBC: CPT

## 2020-08-25 PROCEDURE — 93005 ELECTROCARDIOGRAM TRACING: CPT | Performed by: EMERGENCY MEDICINE

## 2020-08-25 PROCEDURE — 80053 COMPREHEN METABOLIC PANEL: CPT

## 2020-08-25 PROCEDURE — 85379 FIBRIN DEGRADATION QUANT: CPT

## 2020-08-25 PROCEDURE — 84484 ASSAY OF TROPONIN QUANT: CPT

## 2020-08-25 PROCEDURE — 82550 ASSAY OF CK (CPK): CPT

## 2020-08-25 PROCEDURE — 71260 CT THORAX DX C+: CPT

## 2020-08-25 PROCEDURE — 6370000000 HC RX 637 (ALT 250 FOR IP): Performed by: EMERGENCY MEDICINE

## 2020-08-25 PROCEDURE — 71045 X-RAY EXAM CHEST 1 VIEW: CPT

## 2020-08-25 PROCEDURE — 6360000004 HC RX CONTRAST MEDICATION: Performed by: EMERGENCY MEDICINE

## 2020-08-25 RX ADMIN — IOPAMIDOL 90 ML: 755 INJECTION, SOLUTION INTRAVENOUS at 22:40

## 2020-08-25 RX ADMIN — LIDOCAINE HYDROCHLORIDE: 20 SOLUTION ORAL; TOPICAL at 21:30

## 2020-08-25 ASSESSMENT — ENCOUNTER SYMPTOMS
CHOKING: 0
SINUS PRESSURE: 0
SORE THROAT: 0
FACIAL SWELLING: 0
VOICE CHANGE: 0
ABDOMINAL PAIN: 0
EYE DISCHARGE: 0
CONSTIPATION: 0
BLOOD IN STOOL: 0
APNEA: 0
WHEEZING: 0
NAUSEA: 0
COUGH: 0
DIARRHEA: 0
SHORTNESS OF BREATH: 0

## 2020-08-25 ASSESSMENT — PAIN DESCRIPTION - LOCATION: LOCATION: CHEST

## 2020-08-25 ASSESSMENT — PAIN DESCRIPTION - FREQUENCY: FREQUENCY: INTERMITTENT

## 2020-08-25 ASSESSMENT — PAIN DESCRIPTION - PAIN TYPE: TYPE: ACUTE PAIN

## 2020-08-25 ASSESSMENT — PAIN DESCRIPTION - DESCRIPTORS: DESCRIPTORS: STABBING

## 2020-08-25 ASSESSMENT — PAIN SCALES - GENERAL: PAINLEVEL_OUTOF10: 3

## 2020-08-26 LAB
EKG P AXIS: 59 DEGREES
EKG P-R INTERVAL: 166 MS
EKG Q-T INTERVAL: 352 MS
EKG QRS DURATION: 82 MS
EKG QTC CALCULATION (BAZETT): 388 MS
EKG T AXIS: 17 DEGREES

## 2020-08-26 PROCEDURE — 93010 ELECTROCARDIOGRAM REPORT: CPT | Performed by: INTERNAL MEDICINE

## 2020-08-26 NOTE — ED PROVIDER NOTES
[facility] EMERGENCY DEPT  eMERGENCY dEPARTMENT eNCOUnter      Pt Name: Brianna Degn  MRN: [id number]  [place] 1985  Date of evaluation: 8/25/2020  Provider: Deandre Jackson MD    [address] [place] [address]       Chief Complaint   Patient presents with    Chest Pain     started at 11 am         HISTORY OF PRESENT ILLNESS   (Location/Symptom, Timing/Onset,Context/Setting, Quality, Duration, Modifying Factors, Severity)  Note limiting factors. Brianna Deng is a 29 y.o. male who presents to the emergency department evaluation of right-sided chest pain. 28-year-old male developed chest pain around noontime today. He is a salesman and on the . No prior history of coronary artery disease or cardiac issues. He had a negative stress test in 2017 after similar event. No fever or chills or infectious symptoms. He does not suffer from GERD there was no injury. He cannot exacerbate or relieve the symptoms. He had a moment of confusion. It may have been stress related he states he was not hyperventilating. The history is provided by the patient, the spouse and medical records. NursingNotes were reviewed. REVIEW OF SYSTEMS    (2-9 systems for level 4, 10 or more for level 5)     Review of Systems   Constitutional: Negative for chills and fever. HENT: Negative for congestion, drooling, facial swelling, nosebleeds, sinus pressure, sore throat and voice change. Eyes: Negative for discharge. Respiratory: Negative for apnea, cough, choking, shortness of breath and wheezing. Cardiovascular: Positive for chest pain. Negative for leg swelling. Gastrointestinal: Negative for abdominal pain, blood in stool, constipation, diarrhea and nausea. Genitourinary: Negative for dysuria and enuresis. Musculoskeletal: Negative for joint swelling. Skin: Negative for rash and wound. Neurological: Negative for seizures and syncope.    Psychiatric/Behavioral: Negative for behavioral problems, hallucinations and suicidal ideas. All other systems reviewed and are negative. A complete review of systems was performed and is negative except as noted above in the HPI. PAST MEDICAL HISTORY     Past Medical History:   Diagnosis Date    ADHD (attention deficit hyperactivity disorder)          SURGICAL HISTORY     No past surgical history on file. CURRENT MEDICATIONS       Previous Medications    AMPHETAMINE-DEXTROAMPHETAMINE (ADDERALL XR) 15 MG EXTENDED RELEASE CAPSULE    Take 1 capsule by mouth every morning for 90 days. CETIRIZINE (ZYRTEC) 10 MG TABLET    Take 10 mg by mouth as needed for Allergies       ALLERGIES     Patient has no known allergies.     FAMILY HISTORY       Family History   Problem Relation Age of Onset    No Known Problems Mother     No Known Problems Father           SOCIAL HISTORY       Social History     Socioeconomic History    Marital status: Single     Spouse name: Not on file    Number of children: Not on file    Years of education: Not on file    Highest education level: Not on file   Occupational History    Not on file   Social Needs    Financial resource strain: Not on file    Food insecurity     Worry: Not on file     Inability: Not on file    Transportation needs     Medical: Not on file     Non-medical: Not on file   Tobacco Use    Smoking status: Never Smoker    Smokeless tobacco: Never Used   Substance and Sexual Activity    Alcohol use: Yes     Comment: daily     Drug use: No    Sexual activity: Not on file   Lifestyle    Physical activity     Days per week: Not on file     Minutes per session: Not on file    Stress: Not on file   Relationships    Social connections     Talks on phone: Not on file     Gets together: Not on file     Attends [employer] service: Not on file     Active member of club or organization: Not on file     Attends meetings of clubs or organizations: Not on file     Relationship status: Not on file    Intimate partner violence     Fear of current or ex partner: Not on file     Emotionally abused: Not on file     Physically abused: Not on file     Forced sexual activity: Not on file   Other Topics Concern    Not on file   Social History Narrative    Not on file       SCREENINGS    Steff Coma Scale  Eye Opening: Spontaneous  Best Verbal Response: Oriented  Best Motor Response: Obeys commands  Starke Coma Scale Score: 15        PHYSICAL EXAM    (up to 7 for level 4, 8 or more for level 5)     ED Triage Vitals [08/25/20 1928]   BP Temp Temp src Pulse Resp SpO2 Height Weight   125/86 97.7 °F (36.5 °C) -- 87 19 99 % 5' 8\" (1.727 m) 235 lb (106.6 kg)       Physical Exam  Vitals signs and nursing note reviewed. Constitutional:       General: He is not in acute distress. Appearance: He is well-developed. HENT:      Head: Normocephalic and atraumatic. Right Ear: External ear normal.      Left Ear: External ear normal.   Eyes:      General: No scleral icterus. Conjunctiva/sclera: Conjunctivae normal.      Pupils: Pupils are equal, round, and reactive to light. Neck:      Musculoskeletal: Normal range of motion and neck supple. Cardiovascular:      Rate and Rhythm: Normal rate and regular rhythm. Pulses: Normal pulses. Heart sounds: Normal heart sounds. No murmur. Pulmonary:      Effort: Pulmonary effort is normal. No respiratory distress. Breath sounds: Normal breath sounds. No wheezing or rales. Abdominal:      General: Bowel sounds are normal.      Palpations: Abdomen is soft. Tenderness: There is no abdominal tenderness. Musculoskeletal: Normal range of motion. Skin:     General: Skin is warm and dry. Coloration: Skin is not jaundiced or pale. Neurological:      General: No focal deficit present. Mental Status: He is alert and oriented to person, place, and time.    Psychiatric:         Mood and Affect: Mood normal.         Behavior: Behavior normal.         DIAGNOSTIC extra caffeine today whether that is related or not. He may do as he pleases physically but if symptoms worsen then he should stop and if symptoms really worsen or new symptoms he should return for evaluation. CONSULTS:  None    PROCEDURES:  Unless otherwise notedbelow, none     Procedures    FINAL IMPRESSION     1.  Chest pain, unspecified type          DISPOSITION/PLAN   DISPOSITION        PATIENT REFERRED TO:  @FUP@    DISCHARGE MEDICATIONS:  New Prescriptions    No medications on file          (Please note that portions of this note were completed with a voice recognition program.  Efforts were made to edit the dictations butoccasionally words are mis-transcribed.)    Kirk Pro MD (electronically signed)  AttendingEmergency Physician          Caesar Burrell MD  08/25/20 2346       Caesar Burrell MD  08/26/20 0003

## 2020-11-25 ENCOUNTER — VIRTUAL VISIT (OUTPATIENT)
Dept: PRIMARY CARE CLINIC | Age: 35
End: 2020-11-25
Payer: COMMERCIAL

## 2020-11-25 PROCEDURE — 99395 PREV VISIT EST AGE 18-39: CPT | Performed by: FAMILY MEDICINE

## 2020-11-25 RX ORDER — DEXTROAMPHETAMINE SACCHARATE, AMPHETAMINE ASPARTATE MONOHYDRATE, DEXTROAMPHETAMINE SULFATE AND AMPHETAMINE SULFATE 3.75; 3.75; 3.75; 3.75 MG/1; MG/1; MG/1; MG/1
15 CAPSULE, EXTENDED RELEASE ORAL EVERY MORNING
Qty: 90 CAPSULE | Refills: 0 | Status: SHIPPED | OUTPATIENT
Start: 2020-11-25 | End: 2021-03-24 | Stop reason: SDUPTHER

## 2020-11-25 RX ORDER — SILDENAFIL 100 MG/1
100 TABLET, FILM COATED ORAL PRN
Qty: 8 TABLET | Refills: 5 | Status: SHIPPED | OUTPATIENT
Start: 2020-11-25 | End: 2021-03-24 | Stop reason: SDUPTHER
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
## 2020-12-12 ASSESSMENT — ENCOUNTER SYMPTOMS
SORE THROAT: 0
SHORTNESS OF BREATH: 0
NAUSEA: 0
ABDOMINAL PAIN: 0
COUGH: 0
COLOR CHANGE: 0
RHINORRHEA: 0
EYE ITCHING: 0

## 2021-03-24 ENCOUNTER — OFFICE VISIT (OUTPATIENT)
Dept: PRIMARY CARE CLINIC | Age: 36
End: 2021-03-24
Payer: COMMERCIAL

## 2021-03-24 VITALS
SYSTOLIC BLOOD PRESSURE: 118 MMHG | HEIGHT: 68 IN | TEMPERATURE: 98.3 F | OXYGEN SATURATION: 98 % | DIASTOLIC BLOOD PRESSURE: 80 MMHG | BODY MASS INDEX: 36.07 KG/M2 | WEIGHT: 238 LBS | HEART RATE: 87 BPM

## 2021-03-24 DIAGNOSIS — F90.2 ATTENTION DEFICIT HYPERACTIVITY DISORDER (ADHD), COMBINED TYPE: Primary | Chronic | ICD-10-CM

## 2021-03-24 DIAGNOSIS — N52.9 ERECTILE DYSFUNCTION, UNSPECIFIED ERECTILE DYSFUNCTION TYPE: ICD-10-CM

## 2021-03-24 DIAGNOSIS — Z51.81 MEDICATION MONITORING ENCOUNTER: ICD-10-CM

## 2021-03-24 PROCEDURE — 99213 OFFICE O/P EST LOW 20 MIN: CPT | Performed by: FAMILY MEDICINE

## 2021-03-24 PROCEDURE — 80305 DRUG TEST PRSMV DIR OPT OBS: CPT | Performed by: FAMILY MEDICINE

## 2021-03-24 RX ORDER — DEXTROAMPHETAMINE SACCHARATE, AMPHETAMINE ASPARTATE MONOHYDRATE, DEXTROAMPHETAMINE SULFATE AND AMPHETAMINE SULFATE 3.75; 3.75; 3.75; 3.75 MG/1; MG/1; MG/1; MG/1
15 CAPSULE, EXTENDED RELEASE ORAL EVERY MORNING
Qty: 90 CAPSULE | Refills: 0 | Status: SHIPPED | OUTPATIENT
Start: 2021-03-24 | End: 2021-05-27 | Stop reason: SDUPTHER

## 2021-03-24 RX ORDER — SILDENAFIL 100 MG/1
100 TABLET, FILM COATED ORAL PRN
Qty: 30 TABLET | Refills: 3 | Status: SHIPPED | OUTPATIENT
Start: 2021-03-24 | End: 2022-01-27 | Stop reason: SDUPTHER

## 2021-03-24 ASSESSMENT — PATIENT HEALTH QUESTIONNAIRE - PHQ9
SUM OF ALL RESPONSES TO PHQ QUESTIONS 1-9: 0
2. FEELING DOWN, DEPRESSED OR HOPELESS: 0

## 2021-03-24 ASSESSMENT — ENCOUNTER SYMPTOMS
VOMITING: 0
COUGH: 0
WHEEZING: 0
CHEST TIGHTNESS: 0
DIARRHEA: 0
ABDOMINAL PAIN: 0
CONSTIPATION: 0
NAUSEA: 0
SHORTNESS OF BREATH: 0

## 2021-03-24 NOTE — PROGRESS NOTES
Stefani Lal is a 28 y.o. male who presents today for   Chief Complaint   Patient presents with    ADHD     3 month follow up       HPI  Patient is here for f/u ADHD meds. Had a gap in the meds. Notes that the meds help him focus. He is doing well w/ sildenafil for the ED that is likely psychosomatic in nature. No change in PMH, family, social, or surgical history unless mentioned above. Review of Systems   Constitutional: Negative for chills and fever. Respiratory: Negative for cough, chest tightness, shortness of breath and wheezing. Cardiovascular: Negative for chest pain, palpitations and leg swelling. Gastrointestinal: Negative for abdominal pain, constipation, diarrhea, nausea and vomiting. Genitourinary: Negative for difficulty urinating, dysuria and frequency. Past Medical History:   Diagnosis Date    ADHD (attention deficit hyperactivity disorder)        Current Outpatient Medications   Medication Sig Dispense Refill    amphetamine-dextroamphetamine (ADDERALL XR) 15 MG extended release capsule Take 1 capsule by mouth every morning for 90 days. 90 capsule 0    sildenafil (VIAGRA) 100 MG tablet Take 1 tablet by mouth as needed for Erectile Dysfunction Generic sildenafil 100 mg or strongest dose 30 tablet 3    cetirizine (ZYRTEC) 10 MG tablet Take 10 mg by mouth as needed for Allergies       No current facility-administered medications for this visit. No Known Allergies    No past surgical history on file.     Social History     Tobacco Use    Smoking status: Never Smoker    Smokeless tobacco: Never Used   Substance Use Topics    Alcohol use: Yes     Comment: daily     Drug use: No       Family History   Problem Relation Age of Onset    No Known Problems Mother     No Known Problems Father        /80 (Site: Left Upper Arm)   Pulse 87   Temp 98.3 °F (36.8 °C)   Ht 5' 8\" (1.727 m)   Wt 238 lb (108 kg)   SpO2 98%   BMI 36.19 kg/m²     Physical Exam Vitals signs and nursing note reviewed. Constitutional:       General: He is not in acute distress. Appearance: He is well-developed. He is not toxic-appearing or diaphoretic. HENT:      Head: Normocephalic and atraumatic. Cardiovascular:      Rate and Rhythm: Normal rate and regular rhythm. Heart sounds: Normal heart sounds. No murmur. No friction rub. No gallop. Pulmonary:      Effort: Pulmonary effort is normal. No respiratory distress. Breath sounds: Normal breath sounds. No wheezing or rales. Chest:      Chest wall: No tenderness. Abdominal:      General: Bowel sounds are normal. There is no distension. Palpations: Abdomen is soft. There is no mass. Tenderness: There is no abdominal tenderness. There is no guarding or rebound. Musculoskeletal:      Right lower leg: No edema. Left lower leg: No edema. Skin:     General: Skin is warm and dry. Nails: There is no clubbing. Neurological:      Mental Status: He is alert and oriented to person, place, and time. Coordination: Coordination normal.      Gait: Gait normal.         Assessment:    ICD-10-CM    1. Attention deficit hyperactivity disorder (ADHD), combined type  F90.2 POCT Rapid Drug Screen     amphetamine-dextroamphetamine (ADDERALL XR) 15 MG extended release capsule   2. Medication monitoring encounter  Z51.81 POCT Rapid Drug Screen   3. Erectile dysfunction, unspecified erectile dysfunction type  N52.9        Plan:   There are no signs of any abuse, misuse, or usage of the controlled substance in a way that is not directed. Cont current meds. Orders Placed This Encounter   Procedures    POCT Rapid Drug Screen     Orders Placed This Encounter   Medications    amphetamine-dextroamphetamine (ADDERALL XR) 15 MG extended release capsule     Sig: Take 1 capsule by mouth every morning for 90 days.      Dispense:  90 capsule     Refill:  0     generic    sildenafil (VIAGRA) 100 MG tablet     Sig: Take 1 tablet by mouth as needed for Erectile Dysfunction Generic sildenafil 100 mg or strongest dose     Dispense:  30 tablet     Refill:  3     Medications Discontinued During This Encounter   Medication Reason    sildenafil (VIAGRA) 100 MG tablet REORDER    amphetamine-dextroamphetamine (ADDERALL XR) 15 MG extended release capsule REORDER     Patient Instructions   Stay on current medications. Patient given educational handouts and has had all questions answered. Patient voices understanding and agrees to plans along with risks and benefits of plan. Patient isinstructed to continue prior meds, diet, and exercise plans unless instructed otherwise. Patient agrees to follow up as instructed and sooner if needed. Patient agrees to go to ER if condition becomes emergent. Notesmay be completed with dictation device and spelling errors may occur. Materials may be copied and pasted from a notepad outside of EMR, all of which, I, Dr. Frank Man MD, take sole intellectual ownership of and have approved adding to my note. Return in about 4 months (around 7/24/2021) for VV, ADD.

## 2021-05-27 DIAGNOSIS — F90.2 ATTENTION DEFICIT HYPERACTIVITY DISORDER (ADHD), COMBINED TYPE: Chronic | ICD-10-CM

## 2021-05-27 NOTE — TELEPHONE ENCOUNTER
Lucretia Lang called to request a refill on his medication. Last office visit : 3/24/2021   Next office visit : 7/28/2021     Last Arthuro Devries: 5/27/2021  Medication Contract:    Last Fill: 3/24/2021 (receives 90 day supply)    Last UDS:   Amphetamine Screen, Urine   Date Value Ref Range Status   03/24/2021 -  Final     Barbiturate Screen, Urine   Date Value Ref Range Status   03/24/2021 -  Final     Benzodiazepine Screen, Urine   Date Value Ref Range Status   03/24/2021 -  Final     Buprenorphine Urine   Date Value Ref Range Status   03/24/2021 -  Final     Cocaine Metabolite Screen, Urine   Date Value Ref Range Status   03/24/2021 -  Final     Gabapentin Screen, Urine   Date Value Ref Range Status   03/24/2021 -  Final     MDMA, Urine   Date Value Ref Range Status   03/24/2021 -  Final     Methamphetamine, Urine   Date Value Ref Range Status   03/24/2021 -  Final     Opiate Scrn, Ur   Date Value Ref Range Status   03/24/2021 -  Final     Oxycodone Screen, Ur   Date Value Ref Range Status   03/24/2021 -  Final     PCP Screen, Urine   Date Value Ref Range Status   03/24/2021 -  Final     Propoxyphene Screen, Urine   Date Value Ref Range Status   03/24/2021 -  Final     THC Screen, Urine   Date Value Ref Range Status   03/24/2021 -  Final     Tricyclic Antidepressants, Urine   Date Value Ref Range Status   03/24/2021 -  Final                 Requested Prescriptions     Pending Prescriptions Disp Refills    amphetamine-dextroamphetamine (ADDERALL XR) 15 MG extended release capsule 90 capsule 0     Sig: Take 1 capsule by mouth every morning for 90 days. Please approve or refuse this medication.    Chayito Valencia MA

## 2021-05-28 RX ORDER — DEXTROAMPHETAMINE SACCHARATE, AMPHETAMINE ASPARTATE MONOHYDRATE, DEXTROAMPHETAMINE SULFATE AND AMPHETAMINE SULFATE 3.75; 3.75; 3.75; 3.75 MG/1; MG/1; MG/1; MG/1
15 CAPSULE, EXTENDED RELEASE ORAL EVERY MORNING
Qty: 90 CAPSULE | Refills: 0 | Status: SHIPPED | OUTPATIENT
Start: 2021-06-22 | End: 2021-10-21 | Stop reason: SDUPTHER

## 2021-09-23 ENCOUNTER — OFFICE VISIT (OUTPATIENT)
Dept: URGENT CARE | Age: 36
End: 2021-09-23
Payer: COMMERCIAL

## 2021-09-23 VITALS
BODY MASS INDEX: 35.61 KG/M2 | HEART RATE: 77 BPM | DIASTOLIC BLOOD PRESSURE: 81 MMHG | HEIGHT: 68 IN | TEMPERATURE: 98.2 F | OXYGEN SATURATION: 98 % | SYSTOLIC BLOOD PRESSURE: 123 MMHG | WEIGHT: 235 LBS

## 2021-09-23 DIAGNOSIS — Z11.52 ENCOUNTER FOR SCREENING FOR COVID-19: ICD-10-CM

## 2021-09-23 DIAGNOSIS — Z11.59 SCREENING FOR VIRAL DISEASE: Primary | ICD-10-CM

## 2021-09-23 LAB — SARS-COV-2, PCR: NOT DETECTED

## 2021-09-23 PROCEDURE — 99212 OFFICE O/P EST SF 10 MIN: CPT | Performed by: NURSE PRACTITIONER

## 2021-09-23 NOTE — PROGRESS NOTES
Praneeth Lowe presents to the clinic today requesting COVID-19 testing. He is symptomatic/having the following symptoms cough and diarrhea, chills, and nausea. He has had negative exposure. On exam, patient appears in no acute distress. Speech is clear. Breathing is unlabored. Moves all extremities and is ambulatory. The test was performed by the nursing staff. The patient will be called with results. He should quarantine at home. If he tests positive, he should quarantine for a total of 10 days after symptoms began and/or 24 hours after fever resolves without fever reducing medications per CDC guidelines. If patient is asymptomatic he should quarantine for 10 days per CDC guidelines. Patient left in stable condition. 10 minutes spent, which includes face to face with patient, record review, evaluation, planning, and education as well as coordination of his care.

## 2021-10-21 ENCOUNTER — OFFICE VISIT (OUTPATIENT)
Dept: PRIMARY CARE CLINIC | Age: 36
End: 2021-10-21
Payer: COMMERCIAL

## 2021-10-21 VITALS
HEIGHT: 68 IN | WEIGHT: 236 LBS | HEART RATE: 80 BPM | SYSTOLIC BLOOD PRESSURE: 125 MMHG | BODY MASS INDEX: 35.77 KG/M2 | TEMPERATURE: 97.7 F | OXYGEN SATURATION: 98 % | DIASTOLIC BLOOD PRESSURE: 70 MMHG

## 2021-10-21 DIAGNOSIS — F90.2 ATTENTION DEFICIT HYPERACTIVITY DISORDER (ADHD), COMBINED TYPE: Chronic | ICD-10-CM

## 2021-10-21 PROCEDURE — 90674 CCIIV4 VAC NO PRSV 0.5 ML IM: CPT | Performed by: FAMILY MEDICINE

## 2021-10-21 PROCEDURE — 90471 IMMUNIZATION ADMIN: CPT | Performed by: FAMILY MEDICINE

## 2021-10-21 PROCEDURE — 99213 OFFICE O/P EST LOW 20 MIN: CPT | Performed by: FAMILY MEDICINE

## 2021-10-21 RX ORDER — DEXTROAMPHETAMINE SACCHARATE, AMPHETAMINE ASPARTATE MONOHYDRATE, DEXTROAMPHETAMINE SULFATE AND AMPHETAMINE SULFATE 3.75; 3.75; 3.75; 3.75 MG/1; MG/1; MG/1; MG/1
15 CAPSULE, EXTENDED RELEASE ORAL EVERY MORNING
Qty: 90 CAPSULE | Refills: 0 | Status: SHIPPED | OUTPATIENT
Start: 2021-10-21 | End: 2022-01-27 | Stop reason: SDUPTHER

## 2021-10-21 ASSESSMENT — ENCOUNTER SYMPTOMS
ABDOMINAL PAIN: 0
CONSTIPATION: 0
VOMITING: 0
WHEEZING: 0
COUGH: 0
NAUSEA: 0
DIARRHEA: 0
SHORTNESS OF BREATH: 0
CHEST TIGHTNESS: 0

## 2021-10-21 NOTE — PROGRESS NOTES
Darlene Watson is a 28 y.o. male who presents today for   Chief Complaint   Patient presents with    Discuss Medications    ADHD       HPI  Patient is here for ADHD. He is noted to do well on meds. Notes that he has been doing well w/o any issues. Notes they are still trying to get pregnant. Medication is working well. Needs influenza vaccine. No change in PMH, family, social, or surgical history unless mentioned above. I have reviewed the above chief complaint and HPI details charted by staff and claim ownership of the documentation. Review of Systems   Constitutional: Negative for chills and fever. Respiratory: Negative for cough, chest tightness, shortness of breath and wheezing. Cardiovascular: Negative for chest pain, palpitations and leg swelling. Gastrointestinal: Negative for abdominal pain, constipation, diarrhea, nausea and vomiting. Genitourinary: Negative for difficulty urinating, dysuria and frequency. Past Medical History:   Diagnosis Date    ADHD (attention deficit hyperactivity disorder)        Current Outpatient Medications   Medication Sig Dispense Refill    amphetamine-dextroamphetamine (ADDERALL XR) 15 MG extended release capsule Take 1 capsule by mouth every morning for 90 days. 90 capsule 0    sildenafil (VIAGRA) 100 MG tablet Take 1 tablet by mouth as needed for Erectile Dysfunction Generic sildenafil 100 mg or strongest dose 30 tablet 3    cetirizine (ZYRTEC) 10 MG tablet Take 10 mg by mouth as needed for Allergies       No current facility-administered medications for this visit. No Known Allergies    No past surgical history on file.     Social History     Tobacco Use    Smoking status: Never Smoker    Smokeless tobacco: Never Used   Vaping Use    Vaping Use: Never used   Substance Use Topics    Alcohol use: Yes     Comment: daily     Drug use: No       Family History   Problem Relation Age of Onset    No Known Problems Mother     No Known Problems Father        /70 (Site: Left Upper Arm, Position: Sitting)   Pulse 80   Temp 97.7 °F (36.5 °C)   Ht 5' 8\" (1.727 m)   Wt 236 lb (107 kg)   SpO2 98%   BMI 35.88 kg/m²     Physical Exam  Vitals and nursing note reviewed. Constitutional:       General: He is not in acute distress. Appearance: He is well-developed. He is not toxic-appearing or diaphoretic. HENT:      Head: Normocephalic and atraumatic. Cardiovascular:      Rate and Rhythm: Normal rate and regular rhythm. Heart sounds: Normal heart sounds. No murmur heard. No friction rub. No gallop. Pulmonary:      Effort: Pulmonary effort is normal. No respiratory distress. Breath sounds: Normal breath sounds. No wheezing or rales. Chest:      Chest wall: No tenderness. Abdominal:      General: Bowel sounds are normal. There is no distension. Palpations: Abdomen is soft. There is no mass. Tenderness: There is no abdominal tenderness. There is no guarding or rebound. Musculoskeletal:      Right lower leg: No edema. Left lower leg: No edema. Skin:     General: Skin is warm and dry. Nails: There is no clubbing. Neurological:      Mental Status: He is alert and oriented to person, place, and time. Coordination: Coordination normal.      Gait: Gait normal.         Assessment:    ICD-10-CM    1. Attention deficit hyperactivity disorder (ADHD), combined type  F90.2 amphetamine-dextroamphetamine (ADDERALL XR) 15 MG extended release capsule       Plan:   There are no signs of any abuse, misuse, or usage of the controlled substance in a way that is not directed.   I will continue to prescribe controlled substance,  Orders Placed This Encounter   Procedures    INFLUENZA, MDCK QUADV, 2 YRS AND OLDER, IM, PF, PREFILL SYR OR SDV, 0.5ML (FLUCELVAX QUADV, PF)     Orders Placed This Encounter   Medications    amphetamine-dextroamphetamine (ADDERALL XR) 15 MG extended release capsule     Sig: Take 1 capsule by mouth every morning for 90 days. Dispense:  90 capsule     Refill:  0     generic     Medications Discontinued During This Encounter   Medication Reason    amphetamine-dextroamphetamine (ADDERALL XR) 15 MG extended release capsule REORDER     There are no Patient Instructions on file for this visit. Patient given educational handouts and has had all questions answered. Patient voices understanding and agrees to plans along with risks and benefits of plan. Patient isinstructed to continue prior meds, diet, and exercise plans unless instructed otherwise. Patient agrees to follow up as instructed and sooner if needed. Patient agrees to go to ER if condition becomes emergent. Notesmay be completed with dictation device and spelling errors may occur. Materials may be copied and pasted from a notepad outside of EMR, all of which, I, Dr. Good Siu MD, take sole intellectual ownership of and have approved adding to my note. Return in about 3 months (around 1/21/2022) for OV , annual 2 time slots.

## 2021-12-14 ENCOUNTER — OFFICE VISIT (OUTPATIENT)
Dept: URGENT CARE | Age: 36
End: 2021-12-14
Payer: COMMERCIAL

## 2021-12-14 VITALS
SYSTOLIC BLOOD PRESSURE: 112 MMHG | HEIGHT: 68 IN | BODY MASS INDEX: 35.4 KG/M2 | OXYGEN SATURATION: 98 % | HEART RATE: 63 BPM | RESPIRATION RATE: 18 BRPM | TEMPERATURE: 98.1 F | DIASTOLIC BLOOD PRESSURE: 68 MMHG | WEIGHT: 233.6 LBS

## 2021-12-14 DIAGNOSIS — Z11.59 SCREENING FOR VIRAL DISEASE: Primary | ICD-10-CM

## 2021-12-14 LAB — SARS-COV-2, PCR: NOT DETECTED

## 2021-12-14 PROCEDURE — 99212 OFFICE O/P EST SF 10 MIN: CPT | Performed by: NURSE PRACTITIONER

## 2021-12-14 ASSESSMENT — PATIENT HEALTH QUESTIONNAIRE - PHQ9
SUM OF ALL RESPONSES TO PHQ QUESTIONS 1-9: 0
SUM OF ALL RESPONSES TO PHQ9 QUESTIONS 1 & 2: 0
DEPRESSION UNABLE TO ASSESS: FUNCTIONAL CAPACITY MOTIVATION LIMITS ACCURACY
SUM OF ALL RESPONSES TO PHQ QUESTIONS 1-9: 0
1. LITTLE INTEREST OR PLEASURE IN DOING THINGS: 0
SUM OF ALL RESPONSES TO PHQ QUESTIONS 1-9: 0
2. FEELING DOWN, DEPRESSED OR HOPELESS: 0

## 2021-12-14 NOTE — PROGRESS NOTES
Joan Nichole presents to the clinic today requesting COVID-19 testing. He complains of congestion and cough and headache. He has had positive exposure. On exam, patient appears in no acute distress. Speech is clear. Breathing is unlabored. Moves all extremities and is ambulatory. We will order a COVID test today to be performed in clinic. The patient and appropriate authorities will be informed of the results. He should quarantine at home until results are returned. If he tests positive, he should quarantine for a total of 10 days after symptoms began and 24 hours after fever resolves without fever reducing medications per CDC guidelines. Patient was advised that even if asymptomatic, after a positive result he should quarantine for 10 days per [facility] guidelines. Patient left in stable condition. Total of 15 minutes spent, which includes face to face with patient, record review, evaluation, planning, and education as well as coordination of his care.

## 2022-01-27 ENCOUNTER — TELEMEDICINE (OUTPATIENT)
Dept: PRIMARY CARE CLINIC | Age: 37
End: 2022-01-27
Payer: COMMERCIAL

## 2022-01-27 DIAGNOSIS — N52.9 ERECTILE DYSFUNCTION, UNSPECIFIED ERECTILE DYSFUNCTION TYPE: ICD-10-CM

## 2022-01-27 DIAGNOSIS — F90.2 ATTENTION DEFICIT HYPERACTIVITY DISORDER (ADHD), COMBINED TYPE: Primary | Chronic | ICD-10-CM

## 2022-01-27 PROCEDURE — 99213 OFFICE O/P EST LOW 20 MIN: CPT | Performed by: FAMILY MEDICINE

## 2022-01-27 RX ORDER — SILDENAFIL 100 MG/1
100 TABLET, FILM COATED ORAL PRN
Qty: 30 TABLET | Refills: 3 | Status: SHIPPED | OUTPATIENT
Start: 2022-01-27 | End: 2022-07-19 | Stop reason: SDUPTHER

## 2022-01-27 RX ORDER — DEXTROAMPHETAMINE SACCHARATE, AMPHETAMINE ASPARTATE MONOHYDRATE, DEXTROAMPHETAMINE SULFATE AND AMPHETAMINE SULFATE 3.75; 3.75; 3.75; 3.75 MG/1; MG/1; MG/1; MG/1
15 CAPSULE, EXTENDED RELEASE ORAL EVERY MORNING
Qty: 90 CAPSULE | Refills: 0 | Status: SHIPPED | OUTPATIENT
Start: 2022-01-27 | End: 2022-04-21 | Stop reason: SDUPTHER

## 2022-01-27 ASSESSMENT — ENCOUNTER SYMPTOMS
NAUSEA: 0
SHORTNESS OF BREATH: 0
VOMITING: 0
CONSTIPATION: 0
DIARRHEA: 0
ABDOMINAL PAIN: 0
WHEEZING: 0
COUGH: 0
CHEST TIGHTNESS: 0

## 2022-01-27 NOTE — PROGRESS NOTES
2022    TELEHEALTH EVALUATION -- Audio/Visual (During HOCGW-50 public health emergency)    HPI:    Jacob Sorensen (:  1985) has requested an audio/video evaluation for the following concern(s):    Patient presents today via video visit for f/u adhd and dietary changes. He notes that he is making improved diet and sleep changes. Med still working for [place] and ED. Using ED med occasionally, not needed all of the time    Review of Systems   Constitutional: Negative for chills and fever. Respiratory: Negative for cough, chest tightness, shortness of breath and wheezing. Cardiovascular: Negative for chest pain, palpitations and leg swelling. Gastrointestinal: Negative for abdominal pain, constipation, diarrhea, nausea and vomiting. Genitourinary: Negative for difficulty urinating, dysuria and frequency. Prior to Visit Medications    Medication Sig Taking? Authorizing Provider   amphetamine-dextroamphetamine (ADDERALL XR) 15 MG extended release capsule Take 1 capsule by mouth every morning for 90 days.  Yes Ysabel Santana MD   sildenafil (VIAGRA) 100 MG tablet Take 1 tablet by mouth as needed for Erectile Dysfunction Generic sildenafil 100 mg or strongest dose Yes Ysabel Santana MD   cetirizine (ZYRTEC) 10 MG tablet Take 10 mg by mouth as needed for Allergies  Historical Provider, MD       Social History     Tobacco Use    Smoking status: Never Smoker    Smokeless tobacco: Never Used   Vaping Use    Vaping Use: Never used   Substance Use Topics    Alcohol use: Yes     Comment: daily     Drug use: No        No Known Allergies,   Past Medical History:   Diagnosis Date    ADHD (attention deficit hyperactivity disorder)    , No past surgical history on file.,   Social History     Tobacco Use    Smoking status: Never Smoker    Smokeless tobacco: Never Used   Vaping Use    Vaping Use: Never used   Substance Use Topics    Alcohol use: Yes     Comment: daily     Drug use: No   , Family History   Problem Relation Age of Onset    No Known Problems Mother     No Known Problems Father    ,   Immunization History   Administered Date(s) Administered    COVID-19, Marcus Bailonmins, Primary or Immunocompromised, PF, 100mcg/0.5mL 03/13/2021    Influenza, MDCK Quadv, IM, PF (Flucelvax 2 yrs and older) 10/21/2021    Tdap (Boostrix, Adacel) 02/20/2020   ,   Health Maintenance   Topic Date Due    Hepatitis C screen  Never done    Varicella vaccine (1 of 2 - 2-dose childhood series) Never done    HIV screen  Never done    Diabetes screen  Never done    COVID-19 Vaccine (2 - Moderna 3-dose series) 04/10/2021    Depression Screen  12/14/2022    DTaP/Tdap/Td vaccine (2 - Td or Tdap) 02/20/2030    Flu vaccine  Completed    Hepatitis A vaccine  Aged Out    Hepatitis B vaccine  Aged Out    Hib vaccine  Aged Out    Meningococcal (ACWY) vaccine  Aged Out    Pneumococcal 0-64 years Vaccine  Aged Out       PHYSICAL EXAMINATION:  [ INSTRUCTIONS:  \"[x]\" Indicates a positive item  \"[]\" Indicates a negative item  -- DELETE ALL ITEMS NOT EXAMINED]  Vital Signs: (As obtained by patient/caregiver or practitioner observation)    Blood pressure-  Heart rate-    Respiratory rate-    Temperature-  Pulse oximetry-     Constitutional: [x] Appears well-developed and well-nourished [] No apparent distress      [] Abnormal-   Mental status  [x] Alert and awake  [x] Oriented to person/place/time [x]Able to follow commands      Eyes:  EOM    [x]  Normal  [] Abnormal-  Sclera  [x]  Normal  [] Abnormal -         Discharge []  None visible  [] Abnormal -    HENT:   [x] Normocephalic, atraumatic.   [] Abnormal   [x] Mouth/Throat: Mucous membranes are moist.     External Ears [x] Normal  [] Abnormal-     Neck: [x] No visualized mass     Pulmonary/Chest: [x] Respiratory effort normal.  [x] No visualized signs of difficulty breathing or respiratory distress        [] Abnormal-      Musculoskeletal:   [x] Normal gait with no signs of ataxia         [x] Normal range of motion of neck        [] Abnormal-       Neurological:        [x] No Facial Asymmetry (Cranial nerve 7 motor function) (limited exam to video visit)          [x] No gaze palsy        [] Abnormal-         Skin:        [x] No significant exanthematous lesions or discoloration noted on facial skin         [] Abnormal-            Psychiatric:       [x] Normal Affect [x] No Hallucinations        [] Abnormal-     Other pertinent observable physical exam findings-     ASSESSMENT/PLAN:    ICD-10-CM    1. Attention deficit hyperactivity disorder (ADHD), combined type  F90.2 amphetamine-dextroamphetamine (ADDERALL XR) 15 MG extended release capsule   2. Erectile dysfunction, unspecified erectile dysfunction type  N52.9        ED and ADHD controlled, continue current tx plans. Orders Placed This Encounter   Medications    amphetamine-dextroamphetamine (ADDERALL XR) 15 MG extended release capsule     Sig: Take 1 capsule by mouth every morning for 90 days. Dispense:  90 capsule     Refill:  0     generic    sildenafil (VIAGRA) 100 MG tablet     Sig: Take 1 tablet by mouth as needed for Erectile Dysfunction Generic sildenafil 100 mg or strongest dose     Dispense:  30 tablet     Refill:  3       No orders of the defined types were placed in this encounter. Return in about 3 months (around 4/27/2022) for ov, annual 2 time slots. Sherry Jackson is a 39 y.o. male being evaluated by a Virtual Visit (video visit) encounter to address concerns as mentioned above. A caregiver was present when appropriate. Due to this being a TeleHealth encounter (During YXW-80 public health emergency), evaluation of the following organ systems was limited: Vitals/Constitutional/EENT/Resp/CV/GI//MS/Neuro/Skin/Heme-Lymph-Imm.   Pursuant to the emergency declaration under the [address] [place], [address] waiver authority and the [employer] and [employer] Supplemental Appropriations Act, this Virtual Visit was conducted with patient's (and/or legal guardian's) consent, to reduce the patient's risk of exposure to COVID-19 and provide necessary medical care. The patient (and/or legal guardian) has also been advised to contact this office for worsening conditions or problems, and seek emergency medical treatment and/or call 911 if deemed necessary. Services were provided through a video synchronous discussion virtually to substitute for in-person clinic visit. Patient and provider were located at their individual homes or provider at secure site for business. It is possible that material may be posted from a notepad that is used for a Dragon dictation device for dictating notes outside the EMR and I am the original author of all of this content. --Deonte Milligan MD on 1/27/2022 at 7:35 AM    An electronic signature was used to authenticate this note.

## 2022-04-21 ENCOUNTER — OFFICE VISIT (OUTPATIENT)
Dept: PRIMARY CARE CLINIC | Age: 37
End: 2022-04-21
Payer: COMMERCIAL

## 2022-04-21 VITALS
HEART RATE: 69 BPM | HEIGHT: 68 IN | OXYGEN SATURATION: 97 % | DIASTOLIC BLOOD PRESSURE: 84 MMHG | BODY MASS INDEX: 34.86 KG/M2 | TEMPERATURE: 97.8 F | SYSTOLIC BLOOD PRESSURE: 132 MMHG | WEIGHT: 230 LBS

## 2022-04-21 DIAGNOSIS — F90.2 ATTENTION DEFICIT HYPERACTIVITY DISORDER (ADHD), COMBINED TYPE: Primary | Chronic | ICD-10-CM

## 2022-04-21 DIAGNOSIS — E66.09 CLASS 1 OBESITY DUE TO EXCESS CALORIES WITHOUT SERIOUS COMORBIDITY WITH BODY MASS INDEX (BMI) OF 34.0 TO 34.9 IN ADULT: ICD-10-CM

## 2022-04-21 DIAGNOSIS — Z79.899 DRUG THERAPY: ICD-10-CM

## 2022-04-21 LAB
ALCOHOL URINE: NORMAL
AMPHETAMINE SCREEN, URINE: POSITIVE
BARBITURATE SCREEN, URINE: NORMAL
BENZODIAZEPINE SCREEN, URINE: NORMAL
BUPRENORPHINE URINE: NORMAL
COCAINE METABOLITE SCREEN URINE: NORMAL
FENTANYL SCREEN, URINE: NORMAL
GABAPENTIN SCREEN, URINE: NORMAL
MDMA URINE: NORMAL
METHADONE SCREEN, URINE: NORMAL
METHAMPHETAMINE, URINE: NORMAL
OPIATE SCREEN URINE: NORMAL
OXYCODONE SCREEN URINE: NORMAL
PHENCYCLIDINE SCREEN URINE: NORMAL
PROPOXYPHENE SCREEN, URINE: NORMAL
SYNTHETIC CANNABINOIDS(K2) SCREEN, URINE: NORMAL
THC SCREEN, URINE: NORMAL
TRAMADOL SCREEN URINE: NORMAL
TRICYCLIC ANTIDEPRESSANTS, UR: NORMAL

## 2022-04-21 PROCEDURE — 99213 OFFICE O/P EST LOW 20 MIN: CPT | Performed by: FAMILY MEDICINE

## 2022-04-21 PROCEDURE — 80305 DRUG TEST PRSMV DIR OPT OBS: CPT | Performed by: FAMILY MEDICINE

## 2022-04-21 RX ORDER — DEXTROAMPHETAMINE SACCHARATE, AMPHETAMINE ASPARTATE MONOHYDRATE, DEXTROAMPHETAMINE SULFATE AND AMPHETAMINE SULFATE 3.75; 3.75; 3.75; 3.75 MG/1; MG/1; MG/1; MG/1
15 CAPSULE, EXTENDED RELEASE ORAL EVERY MORNING
Qty: 90 CAPSULE | Refills: 0 | Status: SHIPPED | OUTPATIENT
Start: 2022-04-21 | End: 2022-05-20 | Stop reason: SDUPTHER

## 2022-04-21 ASSESSMENT — PATIENT HEALTH QUESTIONNAIRE - PHQ9
SUM OF ALL RESPONSES TO PHQ QUESTIONS 1-9: 0
2. FEELING DOWN, DEPRESSED OR HOPELESS: 0
SUM OF ALL RESPONSES TO PHQ QUESTIONS 1-9: 0
SUM OF ALL RESPONSES TO PHQ QUESTIONS 1-9: 0
SUM OF ALL RESPONSES TO PHQ9 QUESTIONS 1 & 2: 0
1. LITTLE INTEREST OR PLEASURE IN DOING THINGS: 0
SUM OF ALL RESPONSES TO PHQ QUESTIONS 1-9: 0

## 2022-04-21 NOTE — PROGRESS NOTES
James Monzon is a  [facility] y.o. male who presents today for   Chief Complaint   Patient presents with    ADHD     medication doing good       HPI  Patient is here for [facility] treatment. Patient notes that he is doing well without any issues. He is also working on diet and exercise for weight loss. No change in PMH, family, social, or surgical history unless mentioned above. I have reviewed the above chief complaint and HPI details charted by staff and claim ownership of the documentation. Review of Systems    Past Medical History:   Diagnosis Date    ADHD (attention deficit hyperactivity disorder)        Current Outpatient Medications   Medication Sig Dispense Refill    amphetamine-dextroamphetamine (ADDERALL XR) 15 MG extended release capsule Take 1 capsule by mouth every morning for 90 days. 90 capsule 0    sildenafil (VIAGRA) 100 MG tablet Take 1 tablet by mouth as needed for Erectile Dysfunction Generic sildenafil 100 mg or strongest dose 30 tablet 3    cetirizine (ZYRTEC) 10 MG tablet Take 10 mg by mouth as needed for Allergies (Patient not taking: Reported on 4/21/2022)       No current facility-administered medications for this visit. No Known Allergies    History reviewed. No pertinent surgical history. Social History     Tobacco Use    Smoking status: Never Smoker    Smokeless tobacco: Never Used   Vaping Use    Vaping Use: Never used   Substance Use Topics    Alcohol use: Yes     Comment: daily     Drug use: No       Family History   Problem Relation Age of Onset    No Known Problems Mother     No Known Problems Father      Review of Systems  Constitutional: Negative for chills and fever  Respiratory: Negative for cough, chest tightness, shortness of breath, and wheezing.   Cardiovascular: Negative for chest pain, palpitations, and leg swelling  Gastrointestinal: Negative for abdominal pain, constipation, diarrhea, nausea, and vomiting  Genitourinary: Negative for difficulty urinating, dysuria, and frequency  Constitutional:  Well developed, well nourished, no acute distress, non-toxic appearance   Eyes:  PERRL, conjunctiva normal   HENT:  Atraumatic, external ears normal, nose normal, oropharynx moist, no pharyngeal exudates. Neck- normal range of motion, no tenderness, supple   Respiratory:  No respiratory distress, normal breath sounds, no rales, no wheezing   Cardiovascular:  Normal rate, normal rhythm, no murmurs, no gallops, no rubs   GI:  Soft, nondistended, normal bowel sounds, nontender, no organomegaly, no mass, no rebound, no guarding   :  No costovertebral angle tenderness   Musculoskeletal:  No edema, no tenderness, no deformities. Back- no tenderness  Integument:  Well hydrated, no rash   Lymphatic:  No lymphadenopathy noted   Neurologic:  Alert & oriented x 3, CN 2-12 normal, normal motor function, normal sensory function, no focal deficits noted   Psychiatric:  Speech and behavior appropriate  /84   Pulse 69   Temp 97.8 °F (36.6 °C) (Temporal)   Ht 5' 8\" (1.727 m)   Wt 230 lb (104.3 kg)   SpO2 97%   BMI 34.97 kg/m²     Physical Exam    Assessment:    ICD-10-CM    1. Attention deficit hyperactivity disorder (ADHD), combined type  F90.2 amphetamine-dextroamphetamine (ADDERALL XR) 15 MG extended release capsule     POCT Rapid Drug Screen   2. Drug therapy  Z79.899 POCT Rapid Drug Screen   3. Class 1 obesity due to excess calories without serious comorbidity with body mass index (BMI) of 34.0 to 34.9 in adult  E66.09     Z68.34        Plan:   Continue with exercise regimen. Patient to continue ADHD medication as he is currently controlled. Orders Placed This Encounter   Procedures    POCT Rapid Drug Screen     Orders Placed This Encounter   Medications    amphetamine-dextroamphetamine (ADDERALL XR) 15 MG extended release capsule     Sig: Take 1 capsule by mouth every morning for 90 days.      Dispense:  90 capsule     Refill:  0     generic     Medications Discontinued During This Encounter   Medication Reason    amphetamine-dextroamphetamine (ADDERALL XR) 15 MG extended release capsule REORDER     There are no Patient Instructions on file for this visit. Patient given educational handouts and has had all questions answered. Patient voices understanding and agrees to plans along with risks and benefits of plan. Patient isinstructed to continue prior meds, diet, and exercise plans unless instructed otherwise. Patient agrees to follow up as instructed and sooner if needed. Patient agrees to go to ER if condition becomes emergent. Notesmay be completed with dictation device and spelling errors may occur. Materials may be copied and pasted from a notepad outside of EMR, all of which, I, Dr. Jonna Cisneros MD, take sole intellectual ownership of and have approved adding to my note. Return if symptoms worsen or fail to improve.

## 2022-05-18 DIAGNOSIS — F90.2 ATTENTION DEFICIT HYPERACTIVITY DISORDER (ADHD), COMBINED TYPE: Chronic | ICD-10-CM

## 2022-05-18 NOTE — TELEPHONE ENCOUNTER
Giles Russell called to request a refill on his medication. Last office visit : 4/21/2022   Next office visit : Visit date not found     Last UDS:   Amphetamine Screen, Urine   Date Value Ref Range Status   04/21/2022 Positive  Final     Comment:     Adderall Appropriate     Barbiturate Screen, Urine   Date Value Ref Range Status   04/21/2022 -  Final     Benzodiazepine Screen, Urine   Date Value Ref Range Status   04/21/2022 -  Final     Buprenorphine Urine   Date Value Ref Range Status   04/21/2022 -  Final     Cocaine Metabolite Screen, Urine   Date Value Ref Range Status   04/21/2022 -  Final     Gabapentin Screen, Urine   Date Value Ref Range Status   04/21/2022 -  Final     MDMA, Urine   Date Value Ref Range Status   04/21/2022 -  Final     Methamphetamine, Urine   Date Value Ref Range Status   04/21/2022 -  Final     Opiate Scrn, Ur   Date Value Ref Range Status   04/21/2022 -  Final     Oxycodone Screen, Ur   Date Value Ref Range Status   04/21/2022 -  Final     PCP Screen, Urine   Date Value Ref Range Status   04/21/2022 -  Final     Propoxyphene Screen, Urine   Date Value Ref Range Status   04/21/2022 -  Final     THC Screen, Urine   Date Value Ref Range Status   04/21/2022 -  Final     Tricyclic Antidepressants, Urine   Date Value Ref Range Status   04/21/2022 -  Final         Requested Prescriptions     Pending Prescriptions Disp Refills    amphetamine-dextroamphetamine (ADDERALL XR) 15 MG extended release capsule 90 capsule 0     Sig: Take 1 capsule by mouth every morning for 90 days. Please approve or refuse this medication.    Darwin Fitzgerald MA

## 2022-05-20 RX ORDER — DEXTROAMPHETAMINE SACCHARATE, AMPHETAMINE ASPARTATE MONOHYDRATE, DEXTROAMPHETAMINE SULFATE AND AMPHETAMINE SULFATE 3.75; 3.75; 3.75; 3.75 MG/1; MG/1; MG/1; MG/1
15 CAPSULE, EXTENDED RELEASE ORAL EVERY MORNING
Qty: 90 CAPSULE | Refills: 0 | Status: SHIPPED | OUTPATIENT
Start: 2022-05-20 | End: 2022-05-23 | Stop reason: SDUPTHER

## 2022-05-20 NOTE — TELEPHONE ENCOUNTER
JEAN CLAUDE was reviewed today per office protocol. Report shows No discrepancies. Fill pattern is consistent from single provider(s) at single pharmacy(s).     Presciption Escribed

## 2022-05-23 DIAGNOSIS — F90.2 ATTENTION DEFICIT HYPERACTIVITY DISORDER (ADHD), COMBINED TYPE: Chronic | ICD-10-CM

## 2022-05-23 RX ORDER — DEXTROAMPHETAMINE SACCHARATE, AMPHETAMINE ASPARTATE MONOHYDRATE, DEXTROAMPHETAMINE SULFATE AND AMPHETAMINE SULFATE 3.75; 3.75; 3.75; 3.75 MG/1; MG/1; MG/1; MG/1
15 CAPSULE, EXTENDED RELEASE ORAL EVERY MORNING
Qty: 90 CAPSULE | Refills: 0 | Status: SHIPPED | OUTPATIENT
Start: 2022-05-23 | End: 2022-08-10 | Stop reason: SDUPTHER

## 2022-05-23 NOTE — TELEPHONE ENCOUNTER
Medication already sent but [facility] was out of stock.  Sending to [facility] [place] now

## 2022-07-18 RX ORDER — SERTRALINE HYDROCHLORIDE 25 MG/1
TABLET, FILM COATED ORAL
COMMUNITY
Start: 2022-05-20 | End: 2022-07-19 | Stop reason: SDUPTHER

## 2022-07-19 ENCOUNTER — TELEMEDICINE (OUTPATIENT)
Dept: FAMILY MEDICINE CLINIC | Age: 37
End: 2022-07-19
Payer: COMMERCIAL

## 2022-07-19 DIAGNOSIS — U09.9 POST COVID-19 CONDITION, UNSPECIFIED: ICD-10-CM

## 2022-07-19 DIAGNOSIS — F32.A ANXIETY AND DEPRESSION: ICD-10-CM

## 2022-07-19 DIAGNOSIS — F90.2 ATTENTION DEFICIT HYPERACTIVITY DISORDER (ADHD), COMBINED TYPE: Primary | Chronic | ICD-10-CM

## 2022-07-19 DIAGNOSIS — N52.8 OTHER MALE ERECTILE DYSFUNCTION: ICD-10-CM

## 2022-07-19 DIAGNOSIS — F41.9 ANXIETY AND DEPRESSION: ICD-10-CM

## 2022-07-19 DIAGNOSIS — E66.09 CLASS 1 OBESITY DUE TO EXCESS CALORIES WITHOUT SERIOUS COMORBIDITY WITH BODY MASS INDEX (BMI) OF 34.0 TO 34.9 IN ADULT: ICD-10-CM

## 2022-07-19 PROBLEM — Z71.89 COUNSELED ABOUT COVID-19 VIRUS INFECTION: Status: ACTIVE | Noted: 2022-07-19

## 2022-07-19 PROBLEM — E66.811 CLASS 1 OBESITY DUE TO EXCESS CALORIES WITHOUT SERIOUS COMORBIDITY WITH BODY MASS INDEX (BMI) OF 34.0 TO 34.9 IN ADULT: Status: ACTIVE | Noted: 2022-07-19

## 2022-07-19 PROCEDURE — 99214 OFFICE O/P EST MOD 30 MIN: CPT | Performed by: FAMILY MEDICINE

## 2022-07-19 RX ORDER — SILDENAFIL 100 MG/1
100 TABLET, FILM COATED ORAL PRN
Qty: 30 TABLET | Refills: 3 | Status: SHIPPED | OUTPATIENT
Start: 2022-07-19

## 2022-07-19 RX ORDER — NALTREXONE HYDROCHLORIDE 50 MG/1
50 TABLET, FILM COATED ORAL NIGHTLY
COMMUNITY

## 2022-07-19 RX ORDER — SERTRALINE HYDROCHLORIDE 25 MG/1
25 TABLET, FILM COATED ORAL DAILY
Qty: 90 TABLET | Refills: 0 | Status: SHIPPED | OUTPATIENT
Start: 2022-07-19

## 2022-07-19 ASSESSMENT — ENCOUNTER SYMPTOMS
EYE ITCHING: 0
STRIDOR: 0
BACK PAIN: 0
EYE DISCHARGE: 0
COLOR CHANGE: 0
APNEA: 0
FACIAL SWELLING: 0
NAUSEA: 0
VOMITING: 0

## 2022-07-19 NOTE — PROGRESS NOTES
Silvana Olsen (:  1985) is a New patient, here for evaluation of the following:    Assessment & Plan   Below is the assessment and plan developed based on review of pertinent history, physical exam, labs, studies, and medications. 1. Attention deficit hyperactivity disorder (ADHD), combined type  2. Other male erectile dysfunction  -     sildenafil (VIAGRA) 100 MG tablet; Take 1 tablet by mouth as needed for Erectile Dysfunction Generic sildenafil 100 mg or strongest dose, Disp-30 tablet, R-3Normal  3. Post covid-19 condition, unspecified  4. Anxiety and depression  -     sertraline (ZOLOFT) 25 MG tablet; Take 1 tablet by mouth in the morning., Disp-90 tablet, R-0Normal  5. Class 1 obesity due to excess calories without serious comorbidity with body mass index (BMI) of 34.0 to 34.9 in adult    Return in about 3 months (around 10/19/2022) for controlled substance refill, in office. Subjective   HPI  Patient presents to [facility] care, previously seen by Dr Madhuri Crowley, records reviewed. He has been on ADHD medication for approximately 3 years, Adderall XR 15 mg, and states he still doing well on it. He does not need a refill at this time, has approximately 30 days remaining on his 90-day prescription. He does need refill of both Zoloft and sildenafil, both still effective. States he and his wife both had COVID about few weeks ago. He has not had any residual symptoms, but did have decreased appetite during that time. No new concerns. He is running more in effort to lose weight. Review of Systems   Constitutional:  Negative for chills and fever. HENT:  Negative for facial swelling and mouth sores. Eyes:  Negative for discharge and itching. Respiratory:  Negative for apnea and stridor. Cardiovascular:  Negative for chest pain and palpitations. Gastrointestinal:  Negative for nausea and vomiting. Endocrine: Negative for cold intolerance and heat intolerance.    Genitourinary: Negative for frequency and urgency. Musculoskeletal:  Negative for arthralgias and back pain. Skin:  Negative for color change and rash.         Objective   Patient-Reported Vitals  No data recorded     Patient-Reported Vitals 7/19/2022   Patient-Reported Weight 214   Patient-Reported Height 5'8\"       Physical Exam  [INSTRUCTIONS:  \"[x]\" Indicates a positive item  \"[]\" Indicates a negative item  -- DELETE ALL ITEMS NOT EXAMINED]    Constitutional: [x] Appears well-developed and well-nourished [x] No apparent distress      [] Abnormal -     Mental status: [x] Alert and awake  [x] Oriented to person/place/time [x] Able to follow commands    [] Abnormal -     Eyes:   EOM    [x]  Normal    [] Abnormal -   Sclera  [x]  Normal    [] Abnormal -          Discharge [x]  None visible   [] Abnormal -     HENT: [x] Normocephalic, atraumatic  [] Abnormal -   [x] Mouth/Throat: Mucous membranes are moist    External Ears [x] Normal  [] Abnormal -    Neck: [x] No visualized mass [] Abnormal -     Pulmonary/Chest: [x] Respiratory effort normal   [x] No visualized signs of difficulty breathing or respiratory distress        [] Abnormal -      Musculoskeletal:   [x] Normal gait with no signs of ataxia         [x] Normal range of motion of neck        [] Abnormal -     Neurological:        [x] No Facial Asymmetry (Cranial nerve 7 motor function) (limited exam due to video visit)          [x] No gaze palsy        [] Abnormal -          Skin:        [x] No significant exanthematous lesions or discoloration noted on facial skin         [] Abnormal -            Psychiatric:       [x] Normal Affect [] Abnormal -        [x] No Hallucinations    Other pertinent observable physical exam findings:-         On this date 7/19/2022 I have spent 30 minutes reviewing previous notes, test results and face to face (virtual) with the patient discussing the diagnosis and importance of compliance with the treatment plan as well as documenting on the day of the visit. Arslan Rodgers, was evaluated through a synchronous (real-time) audio-video encounter. The patient (or guardian if applicable) is aware that this is a billable service, which includes applicable co-pays. This Virtual Visit was conducted with patient's (and/or legal guardian's) consent. The visit was conducted pursuant to the emergency declaration under the [address] [place] [address] authority and the [employer] General Act. Patient identification was verified, and a caregiver was present when appropriate. The patient was located at Home: [address] [place] [address]  [place] [id number].    Provider was located at [facility] ([facility] Dept): Moni Nguyen 177, [address] Cj Nolen MD

## 2022-07-19 NOTE — LETTER
[address] [place] [address]  Phone: [phone]  Fax: [phone]    Delfino Hernandez MD        August 10, 2022     Patient: Silvia Cavazos   YOB: 1985   Date of Visit: 7/19/2022       To Whom It May Concern:    Please excuse Silvia Cavazos for August 11 and 12. It is my medical opinion that Joseph Don may return to work on August 15 with the following restrictions: lifting/carrying not to exceed 15 lbs, unassisted, due to his back condition. It is my understanding he has been referred to Orthopedics for the same. If you have any questions or concerns, please don't hesitate to call.     Sincerely,        Delfino Hernandez MD

## 2022-07-23 ENCOUNTER — HOSPITAL ENCOUNTER (EMERGENCY)
Age: 37
Discharge: HOME | End: 2022-07-23
Payer: COMMERCIAL

## 2022-07-23 VITALS
TEMPERATURE: 98 F | DIASTOLIC BLOOD PRESSURE: 80 MMHG | SYSTOLIC BLOOD PRESSURE: 133 MMHG | WEIGHT: 230 LBS | RESPIRATION RATE: 16 BRPM | HEART RATE: 71 BPM | OXYGEN SATURATION: 96 % | BODY MASS INDEX: 34.97 KG/M2

## 2022-07-23 DIAGNOSIS — S39.012A STRAIN OF LUMBAR REGION, INITIAL ENCOUNTER: Primary | ICD-10-CM

## 2022-07-23 PROCEDURE — 6360000002 HC RX W HCPCS: Performed by: NURSE PRACTITIONER

## 2022-07-23 PROCEDURE — 99284 EMERGENCY DEPT VISIT MOD MDM: CPT

## 2022-07-23 PROCEDURE — 96372 THER/PROPH/DIAG INJ SC/IM: CPT

## 2022-07-23 RX ORDER — PREDNISONE 20 MG/1
20 TABLET ORAL 2 TIMES DAILY
Qty: 10 TABLET | Refills: 0 | Status: SHIPPED | OUTPATIENT
Start: 2022-07-23 | End: 2022-07-28

## 2022-07-23 RX ORDER — ORPHENADRINE CITRATE 30 MG/ML
60 INJECTION INTRAMUSCULAR; INTRAVENOUS ONCE
Status: COMPLETED | OUTPATIENT
Start: 2022-07-23 | End: 2022-07-23

## 2022-07-23 RX ORDER — PREDNISONE 20 MG/1
20 TABLET ORAL 2 TIMES DAILY
Qty: 10 TABLET | Refills: 0 | Status: SHIPPED | OUTPATIENT
Start: 2022-07-23 | End: 2022-07-23

## 2022-07-23 RX ORDER — TIZANIDINE 4 MG/1
4 TABLET ORAL EVERY 6 HOURS PRN
Qty: 30 TABLET | Refills: 0 | Status: SHIPPED | OUTPATIENT
Start: 2022-07-23 | End: 2022-07-23

## 2022-07-23 RX ORDER — TIZANIDINE 4 MG/1
4 TABLET ORAL EVERY 6 HOURS PRN
Qty: 30 TABLET | Refills: 0 | Status: SHIPPED | OUTPATIENT
Start: 2022-07-23

## 2022-07-23 RX ORDER — KETOROLAC TROMETHAMINE 30 MG/ML
60 INJECTION, SOLUTION INTRAMUSCULAR; INTRAVENOUS ONCE
Status: COMPLETED | OUTPATIENT
Start: 2022-07-23 | End: 2022-07-23

## 2022-07-23 RX ADMIN — ORPHENADRINE CITRATE 60 MG: 30 INJECTION INTRAMUSCULAR; INTRAVENOUS at 17:08

## 2022-07-23 RX ADMIN — KETOROLAC TROMETHAMINE 60 MG: 30 INJECTION, SOLUTION INTRAMUSCULAR at 17:06

## 2022-07-23 ASSESSMENT — PAIN DESCRIPTION - DESCRIPTORS
DESCRIPTORS: SHOOTING
DESCRIPTORS: SHOOTING

## 2022-07-23 ASSESSMENT — PAIN DESCRIPTION - LOCATION
LOCATION: BACK
LOCATION: BACK

## 2022-07-23 ASSESSMENT — PAIN SCALES - GENERAL
PAINLEVEL_OUTOF10: 7
PAINLEVEL_OUTOF10: 7

## 2022-07-23 ASSESSMENT — PAIN DESCRIPTION - ORIENTATION
ORIENTATION: LOWER
ORIENTATION: LOWER;MID

## 2022-07-23 ASSESSMENT — ENCOUNTER SYMPTOMS: BACK PAIN: 1

## 2022-07-23 ASSESSMENT — PAIN - FUNCTIONAL ASSESSMENT: PAIN_FUNCTIONAL_ASSESSMENT: 0-10

## 2022-07-23 NOTE — ED PROVIDER NOTES
[facility] EMERGENCY DEPT  eMERGENCY dEPARTMENT eNCOUnter      Pt Name: Silvia Cavazos  MRN: [id number]  [place] 1985  Date of evaluation: 7/23/2022  Provider: Fransico Catalan, [address]       Chief Complaint   Patient presents with    Back Pain     X 2 weeks bent over last night and pain worse. Lower back no radiation         HISTORY OF PRESENT ILLNESS   (Location/Symptom, Timing/Onset,Context/Setting, Quality, Duration, Modifying Factors, Severity)  Note limiting factors. Silvai Cavazos is a 39 y.o. male who presents to the emergency department with back pain after bending over to pet the dog last night . Had some mild back pain about 2 weeks prior. LIfts a lot at work and also drives a lot. No change in bowel or bladder habits. No fever. Patient does not use IV drugs. No radiation to his legs. Has had back pain before but not this bad. No injury    The history is provided by the patient. Back Pain  Location:  Lumbar spine  Quality:  Aching  Radiates to:  Does not radiate  Pain severity:  Moderate  Pain is: Worse during the day  Onset quality:  Sudden  Duration:  1 day  Context: physical stress    Context: not falling, not recent injury and not twisting      NursingNotes were reviewed. REVIEW OF SYSTEMS    (2-9 systems for level 4, 10 or more for level 5)     Review of Systems   Musculoskeletal:  Positive for back pain. Except as noted above the remainder of the review of systems was reviewed and negative. PAST MEDICAL HISTORY     Past Medical History:   Diagnosis Date    ADHD (attention deficit hyperactivity disorder)          SURGICALHISTORY     History reviewed. No pertinent surgical history.       CURRENT MEDICATIONS       Discharge Medication List as of 7/23/2022  5:59 PM        CONTINUE these medications which have NOT CHANGED    Details   naltrexone (DEPADE) 50 MG tablet Take 50 mg by mouth nightlyHistorical Med      sertraline (ZOLOFT) 25 MG tablet Take 1 tablet by mouth in the morning., Disp-90 tablet, R-0Normal      sildenafil (VIAGRA) 100 MG tablet Take 1 tablet by mouth as needed for Erectile Dysfunction Generic sildenafil 100 mg or strongest dose, Disp-30 tablet, R-3Normal      amphetamine-dextroamphetamine (ADDERALL XR) 15 MG extended release capsule Take 1 capsule by mouth every morning for 90 days. , Disp-90 capsule, R-0Normal      cetirizine (ZYRTEC) 10 MG tablet Take 10 mg by mouth as needed for AllergiesHistorical Med             ALLERGIES     Patient has no known allergies. FAMILY HISTORY       Family History   Problem Relation Age of Onset    No Known Problems Mother     No Known Problems Father           SOCIAL HISTORY       Social History     Socioeconomic History    Marital status:      Spouse name: None    Number of children: None    Years of education: None    Highest education level: None   Tobacco Use    Smoking status: Never    Smokeless tobacco: Never   Vaping Use    Vaping Use: Never used   Substance and Sexual Activity    Alcohol use: Yes     Comment: daily     Drug use: No       SCREENINGS    Steff Coma Scale  Eye Opening: Spontaneous  Best Verbal Response: Oriented  Best Motor Response: Obeys commands  Lawrence Coma Scale Score: 15 @FLOW([id number])@      PHYSICAL EXAM    (up to 7 for level 4, 8 or more for level 5)     ED Triage Vitals   BP Temp Temp src Heart Rate Resp SpO2 Height Weight   07/23/22 1459 07/23/22 1459 -- 07/23/22 1459 07/23/22 1459 07/23/22 1459 -- 07/23/22 1501   133/80 98 °F (36.7 °C)  71 16 96 %  230 lb (104.3 kg)       Physical Exam  Vitals and nursing note reviewed. Constitutional:       Appearance: Normal appearance. He is well-developed. HENT:      Head: Normocephalic and atraumatic. Eyes:      General: No scleral icterus. Right eye: No discharge. Left eye: No discharge. Cardiovascular:      Rate and Rhythm: Normal rate. Pulmonary:      Effort: No respiratory distress.    Musculoskeletal: portions of this note were completed with a voice recognitionprogram.  Efforts were made to edit the dictations but occasionally words are mis-transcribed.)    DIANE Truong (electronically signed)          DIANE Truong  07/24/22 9107

## 2022-07-23 NOTE — Clinical Note
Shazia Akers was seen and treated in our emergency department on 7/23/2022. He may return to work on 07/30/2022. If you have any questions or concerns, please don't hesitate to call.       Lyle Zuñiga, APRN

## 2022-08-08 ENCOUNTER — TELEPHONE (OUTPATIENT)
Dept: PRIMARY CARE CLINIC | Age: 37
End: 2022-08-08

## 2022-08-08 DIAGNOSIS — F90.2 ATTENTION DEFICIT HYPERACTIVITY DISORDER (ADHD), COMBINED TYPE: Chronic | ICD-10-CM

## 2022-08-08 RX ORDER — DEXTROAMPHETAMINE SACCHARATE, AMPHETAMINE ASPARTATE MONOHYDRATE, DEXTROAMPHETAMINE SULFATE AND AMPHETAMINE SULFATE 3.75; 3.75; 3.75; 3.75 MG/1; MG/1; MG/1; MG/1
15 CAPSULE, EXTENDED RELEASE ORAL EVERY MORNING
Qty: 90 CAPSULE | Refills: 0 | OUTPATIENT
Start: 2022-08-08 | End: 2022-11-06

## 2022-08-08 RX ORDER — TIZANIDINE 4 MG/1
4 TABLET ORAL 3 TIMES DAILY
Qty: 30 TABLET | Refills: 0 | Status: SHIPPED | OUTPATIENT
Start: 2022-08-08 | End: 2022-08-18

## 2022-08-09 ENCOUNTER — OFFICE VISIT (OUTPATIENT)
Age: 37
End: 2022-08-09
Payer: COMMERCIAL

## 2022-08-09 ENCOUNTER — E-VISIT (OUTPATIENT)
Dept: FAMILY MEDICINE CLINIC | Age: 37
End: 2022-08-09
Payer: COMMERCIAL

## 2022-08-09 VITALS
HEART RATE: 74 BPM | TEMPERATURE: 97.8 F | RESPIRATION RATE: 12 BRPM | OXYGEN SATURATION: 98 % | HEIGHT: 68 IN | WEIGHT: 219 LBS | DIASTOLIC BLOOD PRESSURE: 80 MMHG | BODY MASS INDEX: 33.19 KG/M2 | SYSTOLIC BLOOD PRESSURE: 118 MMHG

## 2022-08-09 DIAGNOSIS — F90.2 ATTENTION DEFICIT HYPERACTIVITY DISORDER (ADHD), COMBINED TYPE: Primary | Chronic | ICD-10-CM

## 2022-08-09 DIAGNOSIS — S39.012S STRAIN OF LUMBAR PARASPINOUS MUSCLE, SEQUELA: Primary | ICD-10-CM

## 2022-08-09 DIAGNOSIS — M54.50 ACUTE LOW BACK PAIN, UNSPECIFIED BACK PAIN LATERALITY, UNSPECIFIED WHETHER SCIATICA PRESENT: ICD-10-CM

## 2022-08-09 PROCEDURE — 99213 OFFICE O/P EST LOW 20 MIN: CPT | Performed by: NURSE PRACTITIONER

## 2022-08-09 PROCEDURE — 99423 OL DIG E/M SVC 21+ MIN: CPT | Performed by: FAMILY MEDICINE

## 2022-08-09 ASSESSMENT — ENCOUNTER SYMPTOMS
EYES NEGATIVE: 1
RESPIRATORY NEGATIVE: 1
BACK PAIN: 1
GASTROINTESTINAL NEGATIVE: 1
ALLERGIC/IMMUNOLOGIC NEGATIVE: 1

## 2022-08-09 NOTE — PATIENT INSTRUCTIONS
And orthopedic [place] referral has been placed, you will receive a phone call to schedule an evaluation with our office. Use proper technique while picking items up. Avoid lifting heavy items. May apply heating pad for pain relief intermittently 20 minutes on and 20 minutes off.

## 2022-08-09 NOTE — PROGRESS NOTES
Constantin Crockett (:  1985) is a 39 y.o. male,Established patient, here for evaluation of the following chief complaint(s):  Back Pain (Went to the ER on  for issue, no referral placed )    Patient presents today complaining of lower back pain. Patient was initially seen in the emergency department on 2022 and was diagnosed with strain of lumbar region. He denies any pains radiating or shooting down into his legs. Denies loss of urine or bowels. He states he feels the pain is more muscle related. Bilateral paraspinal muscles are tender to palpation. His symptoms are worsened by bending over or picking up items. He states his symptoms were improving and then he picked up his dog which weighs around 30 pounds and symptoms returned. It is also aggravated by sitting in a vehicle for very long. 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 He states that heating pad does help the pain. He has been taking ibuprofen which slightly helps. Discussed with patient I will refer him to orthopedic [place] for evaluation. Possible he may need physical therapy. He is to avoid lifting heavy objects, and to use his legs to lift versus bending over to  items. Instructed patient to continue taking ibuprofen as needed, continue using heating pad intermittently which he may also use in his car while driving, rest, and follow-up with orthopedics. He does have a few muscle relaxers left from the ER visit that he states that has helped him sleep at night. Also prescribing Voltaren gel for patient to apply as needed. ASSESSMENT/PLAN:  1.  Strain of lumbar paraspinous muscle, sequela  -     External Referral To Orthopedic Surgery     Orders Placed This Encounter   Medications    diclofenac sodium (VOLTAREN) 1 % GEL     Sig: Apply 4 g topically 4 times daily as needed for Pain Dispense:  100 g     Refill:  0        Return if symptoms worsen or fail to improve. Subjective   SUBJECTIVE/OBJECTIVE:  Back Pain  This is a recurrent problem. The current episode started 1 to 4 weeks ago. The problem occurs constantly. The problem has been waxing and waning since onset. Pain location: Lumbar paraspinal muscles bilaterally. The quality of the pain is described as aching and cramping. The pain does not radiate. The pain is at a severity of 5/10. The pain is moderate. The pain is Worse during the day. The symptoms are aggravated by bending, sitting and twisting. He has tried NSAIDs, muscle relaxant, ice and heat for the symptoms. The treatment provided mild relief. Review of Systems   Constitutional: Negative. HENT: Negative. Eyes: Negative. Respiratory: Negative. Cardiovascular: Negative. Gastrointestinal: Negative. Endocrine: Negative. Genitourinary: Negative. Musculoskeletal:  Positive for back pain and myalgias. Skin: Negative. Allergic/Immunologic: Negative. Neurological: Negative. Hematological: Negative. Psychiatric/Behavioral: Negative. Objective   Physical Exam  Vitals reviewed. Musculoskeletal:      Lumbar back: Tenderness present. No swelling or deformity. Back:       Comments: Muscles are tender to palpation   Neurological:      Mental Status: He is alert. An electronic signature was used to authenticate this note. --DIANE Dickens - CNP     EMR Dragon/translation disclaimer: Much of this encounter note is an electronic transcription/translation of spoken language to printed text. The electronic translation of spoken language may be erroneous, or at times, nonsensical words or phrases may be inadvertently transcribed.   Although I have reviewed the note for such errors, some may still exist.

## 2022-08-10 RX ORDER — DEXTROAMPHETAMINE SACCHARATE, AMPHETAMINE ASPARTATE MONOHYDRATE, DEXTROAMPHETAMINE SULFATE AND AMPHETAMINE SULFATE 3.75; 3.75; 3.75; 3.75 MG/1; MG/1; MG/1; MG/1
15 CAPSULE, EXTENDED RELEASE ORAL EVERY MORNING
Qty: 30 CAPSULE | Refills: 0 | Status: SHIPPED | OUTPATIENT
Start: 2022-08-15 | End: 2022-08-16 | Stop reason: SDUPTHER

## 2022-08-10 NOTE — PROGRESS NOTES
E-VISIT NOTE    UC and ED notes reviewed. Sending work note as requested. ICD-10-CM    1. Attention deficit hyperactivity disorder (ADHD), combined type  F90.2 amphetamine-dextroamphetamine (ADDERALL XR) 15 MG extended release capsule      2. Acute low back pain, unspecified back pain laterality, unspecified whether sciatica present  M54.50           Orders Placed This Encounter   Medications    amphetamine-dextroamphetamine (ADDERALL XR) 15 MG extended release capsule     Sig: Take 1 capsule by mouth every morning for 30 days. Dispense:  30 capsule     Refill:  0     Generic, ok to fill early, going out of town     The current medical regimen is effective. Continue present plan and medications. UDS and controlled substance contract up to date. No unusual filling on current JEAN CLAUDE report. Tx continues to be medically necessary. 21-30 minutes were spent on the digital evaluation and management of this patient. For further detail, refer to patient message and questioner.

## 2022-08-16 RX ORDER — DEXTROAMPHETAMINE SACCHARATE, AMPHETAMINE ASPARTATE MONOHYDRATE, DEXTROAMPHETAMINE SULFATE AND AMPHETAMINE SULFATE 3.75; 3.75; 3.75; 3.75 MG/1; MG/1; MG/1; MG/1
15 CAPSULE, EXTENDED RELEASE ORAL EVERY MORNING
Qty: 30 CAPSULE | Refills: 0 | Status: SHIPPED | OUTPATIENT
Start: 2022-08-16 | End: 2022-09-15

## 2022-11-09 ENCOUNTER — OFFICE VISIT (OUTPATIENT)
Age: 37
End: 2022-11-09
Payer: COMMERCIAL

## 2022-11-09 VITALS
BODY MASS INDEX: 33.95 KG/M2 | SYSTOLIC BLOOD PRESSURE: 122 MMHG | HEART RATE: 99 BPM | WEIGHT: 224 LBS | DIASTOLIC BLOOD PRESSURE: 84 MMHG | TEMPERATURE: 97.7 F | HEIGHT: 68 IN | OXYGEN SATURATION: 96 %

## 2022-11-09 DIAGNOSIS — T14.8XXA SUPERFICIAL LACERATION: Primary | ICD-10-CM

## 2022-11-09 PROCEDURE — 90471 IMMUNIZATION ADMIN: CPT | Performed by: NURSE PRACTITIONER

## 2022-11-09 PROCEDURE — 99213 OFFICE O/P EST LOW 20 MIN: CPT | Performed by: NURSE PRACTITIONER

## 2022-11-09 PROCEDURE — 90715 TDAP VACCINE 7 YRS/> IM: CPT | Performed by: NURSE PRACTITIONER

## 2022-11-09 ASSESSMENT — ENCOUNTER SYMPTOMS
GASTROINTESTINAL NEGATIVE: 1
EYES NEGATIVE: 1
ALLERGIC/IMMUNOLOGIC NEGATIVE: 1
RESPIRATORY NEGATIVE: 1

## 2022-11-09 NOTE — PATIENT INSTRUCTIONS
Keep abrasion clean and dry. May apply Neosporin. Follow-up with your PCP if you notice any redness, swelling, drainage, or warmth.

## 2022-11-09 NOTE — PROGRESS NOTES
Isadora Jimenez (:  1985) is a 39 y.o. male,Established patient, here for evaluation of the following chief complaint(s):  Laceration (Small cut left ring finger and was cut on a esperanza barrel worried about tetanus )    Patient presents today requesting a tetanus injection as he has a very small on his fourth finger on left hand. He states he cut it on a esperanza barrel 2 days ago. Denies fever, drainage, redness or warmth. Discussed with patient I will order a tetanus injection. Care instructions discussed. Patient verbalized understanding and agrees to plan of care. ASSESSMENT/PLAN:  1. Superficial laceration   Orders Placed This Encounter   Medications    Tetanus-Diphth-Acell Pertussis (BOOSTRIX) injection 0.5 mL        Return if symptoms worsen or fail to improve. Subjective   SUBJECTIVE/OBJECTIVE:  Laceration       Review of Systems   Constitutional: Negative. HENT: Negative. Eyes: Negative. Respiratory: Negative. Cardiovascular: Negative. Gastrointestinal: Negative. Endocrine: Negative. Genitourinary: Negative. Musculoskeletal: Negative. Skin:  Positive for wound. Allergic/Immunologic: Negative. Neurological: Negative. Hematological: Negative. Psychiatric/Behavioral: Negative. Objective   Physical Exam  Vitals reviewed. Skin:     Findings: Abrasion present. Comments: Very superficial abrasion/laceration on fourth finger on left hand. No redness, swelling, drainage, or warmth noted. Paper cut appearance. Neurological:      Mental Status: He is alert. Patient Instructions     Keep abrasion clean and dry. May apply Neosporin. Follow-up with your PCP if you notice any redness, swelling, drainage, or warmth. An electronic signature was used to authenticate this note.     --DAINE No - CNP     EMR Dragon/translation disclaimer: Much of this encounter note is an electronic transcription/translation of spoken language to printed text. The electronic translation of spoken language may be erroneous, or at times, nonsensical words or phrases may be inadvertently transcribed.   Although I have reviewed the note for such errors, some may still exist.

## 2024-10-28 ENCOUNTER — OFFICE VISIT (OUTPATIENT)
Age: 39
End: 2024-10-28
Payer: COMMERCIAL

## 2024-10-28 VITALS
OXYGEN SATURATION: 95 % | HEIGHT: 68 IN | TEMPERATURE: 97.8 F | DIASTOLIC BLOOD PRESSURE: 72 MMHG | RESPIRATION RATE: 18 BRPM | SYSTOLIC BLOOD PRESSURE: 122 MMHG | BODY MASS INDEX: 35.61 KG/M2 | HEART RATE: 82 BPM | WEIGHT: 235 LBS

## 2024-10-28 DIAGNOSIS — J02.9 PHARYNGITIS, UNSPECIFIED ETIOLOGY: Primary | ICD-10-CM

## 2024-10-28 LAB — S PYO AG THROAT QL: NORMAL

## 2024-10-28 PROCEDURE — 99213 OFFICE O/P EST LOW 20 MIN: CPT

## 2024-10-28 PROCEDURE — 87880 STREP A ASSAY W/OPTIC: CPT

## 2024-10-28 ASSESSMENT — ENCOUNTER SYMPTOMS
VOICE CHANGE: 0
COUGH: 1
CONSTIPATION: 0
NAUSEA: 0
SHORTNESS OF BREATH: 0
TROUBLE SWALLOWING: 0
SORE THROAT: 1
DIARRHEA: 0
ABDOMINAL PAIN: 0

## 2024-10-28 NOTE — PATIENT INSTRUCTIONS
Strep test negative.   Throat culture pending. Discussed in office that if throat culture grows harmful bacteria, an antibiotic will be prescribed. If throat culture is negative for harmful bacteria, etiology is likely viral.   Increase fluids. Avoid carbonated beverages, as this can irritate the throat more   Throat lozenges, chloraseptic sprays, honey, or salt water gargles, as needed  Recommended OTC Robitussin or Delsym for cough and congestion   Monitor for fever and treat as needed with tylenol or ibuprofen, unless contraindicated.   The patient is to follow up with PCP or return to clinic if symptoms worsen/fail to improve.    Any condition can change, despite proper treatment. Therefore, if symptoms still persist or worsen after treatment plan intitated today, patient is to go to the nearest ER, call PCP, or return to urgent care for further evaluation.     Urgent Care evaluation today is not a substitute for PCP visit. Follow up care is the patient's responsibility to discuss and review this UC visit.

## 2024-10-28 NOTE — PROGRESS NOTES
[facility]  [facility]  [address] [place] [address] DRIVE  [facility] [address]  Dept: [phone]  Dept Fax: [phone]  Loc: [phone]    Shayne Sales is a 38 y.o. male who presents today for his medical conditions/complaints as noted below.  Shayne Sales is complaining of Cough and Sore Throat      HPI:     Shayne Sales presents to clinic for evaluation of cough and sore throat. Symptoms began last night, but mostly this morning. Denies known exposure to sick contacts but states \"my wife has white patches on her throat.\" Denies fever. No alleviating factors reported for his symptoms. Symptoms are mild in severity ; he is stable.     Cough  Associated symptoms include a sore throat. Pertinent negatives include no chest pain, chills, ear pain, fever or shortness of breath.       Past Medical History:   Diagnosis Date    ADHD (attention deficit hyperactivity disorder)        Past Surgical History:   Procedure Laterality Date    WISDOM TOOTH EXTRACTION         Family History   Problem Relation Age of Onset    No Known Problems Mother     No Known Problems Father        Social History     Tobacco Use    Smoking status: Never    Smokeless tobacco: Never   Substance Use Topics    Alcohol use: Not Currently        Current Outpatient Medications   Medication Sig Dispense Refill    naltrexone (DEPADE) 50 MG tablet Take 1 tablet by mouth nightly      sertraline (ZOLOFT) 25 MG tablet Take 1 tablet by mouth in the morning. 90 tablet 0    amphetamine-dextroamphetamine (ADDERALL XR) 15 MG extended release capsule Take 1 capsule by mouth every morning for 30 days. 30 capsule 0    diclofenac sodium (VOLTAREN) 1 % GEL Apply 4 g topically 4 times daily as needed for Pain (Patient not taking: Reported on 11/9/2022) 100 g 0    tiZANidine (ZANAFLEX) 4 MG tablet Take 1 tablet by mouth every 6 hours as needed (back pain) (Patient not taking: Reported on 11/9/2022) 30

## 2024-10-30 LAB — BACTERIA THROAT AEROBE CULT: NORMAL
